# Patient Record
Sex: MALE | Race: WHITE | NOT HISPANIC OR LATINO | Employment: OTHER | URBAN - METROPOLITAN AREA
[De-identification: names, ages, dates, MRNs, and addresses within clinical notes are randomized per-mention and may not be internally consistent; named-entity substitution may affect disease eponyms.]

---

## 2022-04-25 ENCOUNTER — HOSPITAL ENCOUNTER (EMERGENCY)
Facility: CLINIC | Age: 72
Discharge: HOME | End: 2022-04-25
Attending: EMERGENCY MEDICINE | Admitting: EMERGENCY MEDICINE
Payer: COMMERCIAL

## 2022-04-25 VITALS
DIASTOLIC BLOOD PRESSURE: 91 MMHG | SYSTOLIC BLOOD PRESSURE: 153 MMHG | OXYGEN SATURATION: 94 % | HEART RATE: 49 BPM | RESPIRATION RATE: 13 BRPM | TEMPERATURE: 97.4 F

## 2022-04-25 DIAGNOSIS — R55 NEAR SYNCOPE: ICD-10-CM

## 2022-04-25 DIAGNOSIS — R11.2 NON-INTRACTABLE VOMITING WITH NAUSEA, UNSPECIFIED VOMITING TYPE: ICD-10-CM

## 2022-04-25 LAB
ALBUMIN SERPL-MCNC: 3.2 G/DL (ref 3.4–5)
ALP SERPL-CCNC: 90 U/L (ref 40–150)
ALT SERPL W P-5'-P-CCNC: 33 U/L (ref 0–70)
ANION GAP SERPL CALCULATED.3IONS-SCNC: 6 MMOL/L (ref 3–14)
AST SERPL W P-5'-P-CCNC: 18 U/L (ref 0–45)
BASOPHILS # BLD AUTO: 0.1 10E3/UL (ref 0–0.2)
BASOPHILS NFR BLD AUTO: 1 %
BILIRUB SERPL-MCNC: 0.8 MG/DL (ref 0.2–1.3)
BUN SERPL-MCNC: 20 MG/DL (ref 7–30)
CALCIUM SERPL-MCNC: 8.6 MG/DL (ref 8.5–10.1)
CHLORIDE BLD-SCNC: 107 MMOL/L (ref 94–109)
CO2 SERPL-SCNC: 25 MMOL/L (ref 20–32)
CREAT SERPL-MCNC: 1.12 MG/DL (ref 0.66–1.25)
EOSINOPHIL # BLD AUTO: 0.2 10E3/UL (ref 0–0.7)
EOSINOPHIL NFR BLD AUTO: 3 %
ERYTHROCYTE [DISTWIDTH] IN BLOOD BY AUTOMATED COUNT: 12.9 % (ref 10–15)
GFR SERPL CREATININE-BSD FRML MDRD: 71 ML/MIN/1.73M2
GLUCOSE BLD-MCNC: 142 MG/DL (ref 70–99)
HCT VFR BLD AUTO: 47.5 % (ref 40–53)
HGB BLD-MCNC: 15.6 G/DL (ref 13.3–17.7)
IMM GRANULOCYTES # BLD: 0 10E3/UL
IMM GRANULOCYTES NFR BLD: 0 %
LIPASE SERPL-CCNC: 98 U/L (ref 73–393)
LYMPHOCYTES # BLD AUTO: 1.9 10E3/UL (ref 0.8–5.3)
LYMPHOCYTES NFR BLD AUTO: 22 %
MCH RBC QN AUTO: 28.9 PG (ref 26.5–33)
MCHC RBC AUTO-ENTMCNC: 32.8 G/DL (ref 31.5–36.5)
MCV RBC AUTO: 88 FL (ref 78–100)
MONOCYTES # BLD AUTO: 0.9 10E3/UL (ref 0–1.3)
MONOCYTES NFR BLD AUTO: 10 %
NEUTROPHILS # BLD AUTO: 5.6 10E3/UL (ref 1.6–8.3)
NEUTROPHILS NFR BLD AUTO: 64 %
NRBC # BLD AUTO: 0 10E3/UL
NRBC BLD AUTO-RTO: 0 /100
PLATELET # BLD AUTO: 164 10E3/UL (ref 150–450)
POTASSIUM BLD-SCNC: 3.9 MMOL/L (ref 3.4–5.3)
PROT SERPL-MCNC: 6.8 G/DL (ref 6.8–8.8)
RBC # BLD AUTO: 5.4 10E6/UL (ref 4.4–5.9)
SODIUM SERPL-SCNC: 138 MMOL/L (ref 133–144)
TROPONIN I SERPL HS-MCNC: 7 NG/L
WBC # BLD AUTO: 8.8 10E3/UL (ref 4–11)

## 2022-04-25 PROCEDURE — 84484 ASSAY OF TROPONIN QUANT: CPT | Performed by: EMERGENCY MEDICINE

## 2022-04-25 PROCEDURE — 258N000003 HC RX IP 258 OP 636: Performed by: EMERGENCY MEDICINE

## 2022-04-25 PROCEDURE — 96361 HYDRATE IV INFUSION ADD-ON: CPT

## 2022-04-25 PROCEDURE — 80053 COMPREHEN METABOLIC PANEL: CPT | Performed by: EMERGENCY MEDICINE

## 2022-04-25 PROCEDURE — 250N000011 HC RX IP 250 OP 636: Performed by: EMERGENCY MEDICINE

## 2022-04-25 PROCEDURE — 83690 ASSAY OF LIPASE: CPT | Performed by: EMERGENCY MEDICINE

## 2022-04-25 PROCEDURE — 96374 THER/PROPH/DIAG INJ IV PUSH: CPT

## 2022-04-25 PROCEDURE — 99284 EMERGENCY DEPT VISIT MOD MDM: CPT | Mod: 25

## 2022-04-25 PROCEDURE — 85004 AUTOMATED DIFF WBC COUNT: CPT | Performed by: EMERGENCY MEDICINE

## 2022-04-25 PROCEDURE — 36415 COLL VENOUS BLD VENIPUNCTURE: CPT | Performed by: EMERGENCY MEDICINE

## 2022-04-25 PROCEDURE — 93005 ELECTROCARDIOGRAM TRACING: CPT

## 2022-04-25 RX ORDER — ONDANSETRON 2 MG/ML
4 INJECTION INTRAMUSCULAR; INTRAVENOUS ONCE
Status: COMPLETED | OUTPATIENT
Start: 2022-04-25 | End: 2022-04-25

## 2022-04-25 RX ADMIN — ONDANSETRON 4 MG: 2 INJECTION INTRAMUSCULAR; INTRAVENOUS at 10:12

## 2022-04-25 RX ADMIN — SODIUM CHLORIDE, POTASSIUM CHLORIDE, SODIUM LACTATE AND CALCIUM CHLORIDE 1000 ML: 600; 310; 30; 20 INJECTION, SOLUTION INTRAVENOUS at 10:13

## 2022-04-25 ASSESSMENT — ENCOUNTER SYMPTOMS
CONFUSION: 1
LIGHT-HEADEDNESS: 1
DIAPHORESIS: 1
VOMITING: 1
NAUSEA: 1
WEAKNESS: 1

## 2022-04-25 NOTE — ED NOTES
Patient arrives via EMS, patient was having breakfast with friends and began not feeling well, patient nauseated and having vomiting. Patient has had similar episodes before.  Lungs sounds clear and equal, active bowel sounds in all quadrants.

## 2022-04-25 NOTE — ED PROVIDER NOTES
"  History   Chief Complaint:  Vomiting       The history is provided by the patient and the EMS personnel.      Alex Youngblood is a 71 year old male with history of seizures who presents via EMS with concern for seizure. The patient reportedly woke up at 6:15 AM this morning and went through his normal routine of sitting and reading the paper while drinking his coffee. At some point during breakfast the patient began to feel lightheaded and per EMS had a \"staring episode\" wherein he became unresponsive. He reports feeling woozy and nauseated. Patient denies having the sensation of body warmth or visual changes though he did become quite diaphoretic once the episode ended and also vomited.    Here in ED the patient still feel generally week but overall feels improved. He is no longer nauseated. The patient has not identified any pattern to link the episodes. He notes that he is sometimes able to \"fight off\" the episodes when they come on more slowly but states that he was unable to do so today due to acute onset. No headache. His bowel and bladder are functioning normally. He does report that the last time he had similar symptoms was ~1 year ago following a past episode. He adds that he was evaluated by Cardiology and Neurology at that time but no diagnosis was found. Patient states that he was told the episodes may have occurred secondary to dehydration. He adds that it bothers him that the episodes are so disruptive and thinks this anxiety may be triggering his episodes in some part. He does not recall discussing medication or PT at any point in the past to try and prevent recurring episodes nor made a plan for if the episodes return. Patient is a fairly active individual and tried to exercise regularly. He was planning on working out today before his episode occurred. He denies any prior issues with blood sugar.    Review of Systems   Constitutional: Positive for diaphoresis (after episode).   Eyes: Negative for " visual disturbance.   Gastrointestinal: Positive for nausea (improving) and vomiting.   Neurological: Positive for weakness and light-headedness.   Psychiatric/Behavioral: Positive for confusion (unresponsive staring episode).   All other systems reviewed and are negative.    Allergies:  Oxycodone    Medications:  Amlodipine   Atorvastatin  Losartan    Past Medical History:     Seizures  Hypertension    Past Surgical History:    Right knee arthroscopy  Colonoscopy  Bilateral ankle reconstruction    Family History:    Hypertension    Social History:  The patient was accompanied to the ED by EMS.  Marital status:   The patient resides at home with his wife.  Occupation: Retired, was formerly a teacher    Physical Exam     Patient Vitals for the past 24 hrs:   BP Temp Pulse Resp SpO2   04/25/22 1200 (!) 153/91 -- (!) 49 -- --   04/25/22 1100 (!) 143/74 -- (!) 49 13 94 %   04/25/22 0930 121/66 97.4  F (36.3  C) 50 18 95 %       Physical Exam    HENT:  mmm, no rhinorrhea  Eyes: periorbital tissues and sclera normal, pupils 4 mm and reactive  Neck: supple, no abnormal swelling  Lungs:  CTAB,  no resp distress  CV: rrr, no m/r/g, ppi  Abd: soft, nontender, nondistended, no rebound/masses/guarding/hsm  Ext: no peripheral edema  Skin: warm, dry, well perfused, no rashes/bruising/lesions on exposed skin  Neuro: alert, MAEE, no gross motor or sensory deficits, gait stable  Psych: Normal mood, normal affect      Emergency Department Course     ECG  ECG obtained at 1138, ECG read at 1138  Sinus bradycardia  Nonspecific T wave abnormality   Agree with computer interpretation.   Rate 52 bpm. VT interval 150 ms. QRS duration 88 ms. QT/QTc 452/420 ms. P-R-T axes 56 63 82.     Laboratory:  CBC: WBC 8.8, HGB 15.6,   CMP: Glucose 142 (H), Albumin 3.2 (L), o/w WNL (Creatinine 1.12)  Lipase: 98    Troponin I high sensitivity (Collected 1013): 7    Emergency Department Course:     Reviewed:  I reviewed nursing notes, vitals,  past medical history and Care Everywhere.    Assessments:  0942 I obtained history and examined the patient in ED room 16 as noted above.   1116 I rechecked the patient and explained findings.     Interventions:  1012 Zofran 4 mg IV  1013 LR 1L IV Bolus     Disposition:  The patient was discharged to home.     Impression & Plan       Medical Decision Makin-year-old gentleman here after episode of what sounds like near syncope and post episode of vomiting.  He had similar episodes before had a thorough work-up for both neurology and cardiology that is reviewed in care everywhere.  No etiology was found at that time and thought to be neurocardiogenic syncope.  His wife notes that recently about 3 weeks ago he was restarted on blood pressure medication.  He did well here in the ED with no recurrent episodes.  EKG shows no signs of ischemia or high risk for arrhythmia.  He had previously completed a Zio patch during work-up of his last episodes and no concerns were found.  3-day EEG was negative as well.  No focal neurologic deficits are noted here.  No description of ictal activity or classic postictal period.   Given his appearance here reassuring examination EKG blood work and previous thorough work-up but he is safe to be discharged home to follow-up with his primary care provider.  Would have him hold his restarting blood pressure medicine until he can see his primary doctor in follow-up to discuss risks and benefits.    Diagnosis:    ICD-10-CM    1. Near syncope  R55    2. Non-intractable vomiting with nausea, unspecified vomiting type  R11.2        Discharge Medications:  None.      Scribe Disclosure:  I, Heather Broussard, am serving as a scribe at 9:42 AM on 2022 to document services personally performed by Hollis Fine MD based on my observations and the provider's statements to me.     This note was completed in part using Dragon voice recognition software. Although reviewed after  completion, some word and grammatical errors may occur.              Hollis Fine MD  04/25/22 1883

## 2022-04-25 NOTE — ED NOTES
Patient ready for discharge, patient sat up on edge of bed and became dizzy and stated he didn't feel like he could leave yet.  Writer provided education regarding what he was treated for today and patient continued to state that he felt dizzy and needed another 30 to 40 mins before he leaves. Provided notified.

## 2022-04-25 NOTE — ED NOTES
Patient placed on call light and stated he feels like he can go home, patient states he still gets slightly dizzy but feels as if he can go home safely. Patient assisted out to car with wife.

## 2022-04-25 NOTE — ED TRIAGE NOTES
"Patient presents to the ED via ambulance following an episode of \"staring\" while at breakfast. Per report, patient was staring and not responsive. Then vomited and became very diaphoretic. Was not postictal afterwards. Patient states has had similar episodes to this in the past and has been worked up by cardiology and neurology with no diagnosis. States is weak, but is starting to feel better.   "

## 2022-04-26 LAB
ATRIAL RATE - MUSE: 52 BPM
DIASTOLIC BLOOD PRESSURE - MUSE: NORMAL MMHG
INTERPRETATION ECG - MUSE: NORMAL
P AXIS - MUSE: 56 DEGREES
PR INTERVAL - MUSE: 150 MS
QRS DURATION - MUSE: 88 MS
QT - MUSE: 452 MS
QTC - MUSE: 420 MS
R AXIS - MUSE: 63 DEGREES
SYSTOLIC BLOOD PRESSURE - MUSE: NORMAL MMHG
T AXIS - MUSE: 82 DEGREES
VENTRICULAR RATE- MUSE: 52 BPM

## 2022-09-03 ENCOUNTER — HOSPITAL ENCOUNTER (EMERGENCY)
Facility: CLINIC | Age: 72
Discharge: HOME | End: 2022-09-03
Attending: PHYSICIAN ASSISTANT | Admitting: PHYSICIAN ASSISTANT
Payer: COMMERCIAL

## 2022-09-03 ENCOUNTER — APPOINTMENT (OUTPATIENT)
Dept: CT IMAGING | Facility: CLINIC | Age: 72
End: 2022-09-03
Attending: PHYSICIAN ASSISTANT
Payer: COMMERCIAL

## 2022-09-03 VITALS
RESPIRATION RATE: 18 BRPM | WEIGHT: 210 LBS | BODY MASS INDEX: 28.44 KG/M2 | DIASTOLIC BLOOD PRESSURE: 77 MMHG | OXYGEN SATURATION: 93 % | HEIGHT: 72 IN | HEART RATE: 49 BPM | SYSTOLIC BLOOD PRESSURE: 136 MMHG | TEMPERATURE: 97.6 F

## 2022-09-03 DIAGNOSIS — R40.4 EPISODE OF ALTERED CONSCIOUSNESS: ICD-10-CM

## 2022-09-03 DIAGNOSIS — R56.9 SEIZURE-LIKE ACTIVITY (H): ICD-10-CM

## 2022-09-03 LAB
ALBUMIN SERPL BCG-MCNC: 3.8 G/DL (ref 3.5–5.2)
ALP SERPL-CCNC: 104 U/L (ref 40–129)
ALT SERPL W P-5'-P-CCNC: 21 U/L (ref 10–50)
ANION GAP SERPL CALCULATED.3IONS-SCNC: 14 MMOL/L (ref 7–15)
AST SERPL W P-5'-P-CCNC: 22 U/L (ref 10–50)
BASOPHILS # BLD AUTO: 0.1 10E3/UL (ref 0–0.2)
BASOPHILS NFR BLD AUTO: 1 %
BILIRUB SERPL-MCNC: 0.8 MG/DL
BUN SERPL-MCNC: 18.5 MG/DL (ref 8–23)
CALCIUM SERPL-MCNC: 8.6 MG/DL (ref 8.8–10.2)
CHLORIDE SERPL-SCNC: 103 MMOL/L (ref 98–107)
CREAT SERPL-MCNC: 1.17 MG/DL (ref 0.67–1.17)
DEPRECATED HCO3 PLAS-SCNC: 22 MMOL/L (ref 22–29)
EOSINOPHIL # BLD AUTO: 0.3 10E3/UL (ref 0–0.7)
EOSINOPHIL NFR BLD AUTO: 3 %
ERYTHROCYTE [DISTWIDTH] IN BLOOD BY AUTOMATED COUNT: 12.8 % (ref 10–15)
GFR SERPL CREATININE-BSD FRML MDRD: 67 ML/MIN/1.73M2
GLUCOSE SERPL-MCNC: 155 MG/DL (ref 70–99)
HCT VFR BLD AUTO: 49.4 % (ref 40–53)
HGB BLD-MCNC: 15.7 G/DL (ref 13.3–17.7)
HOLD SPECIMEN: NORMAL
HOLD SPECIMEN: NORMAL
IMM GRANULOCYTES # BLD: 0 10E3/UL
IMM GRANULOCYTES NFR BLD: 0 %
LYMPHOCYTES # BLD AUTO: 3.3 10E3/UL (ref 0.8–5.3)
LYMPHOCYTES NFR BLD AUTO: 31 %
MAGNESIUM SERPL-MCNC: 2 MG/DL (ref 1.7–2.3)
MCH RBC QN AUTO: 28.2 PG (ref 26.5–33)
MCHC RBC AUTO-ENTMCNC: 31.8 G/DL (ref 31.5–36.5)
MCV RBC AUTO: 89 FL (ref 78–100)
MONOCYTES # BLD AUTO: 1.2 10E3/UL (ref 0–1.3)
MONOCYTES NFR BLD AUTO: 11 %
NEUTROPHILS # BLD AUTO: 5.6 10E3/UL (ref 1.6–8.3)
NEUTROPHILS NFR BLD AUTO: 54 %
NRBC # BLD AUTO: 0 10E3/UL
NRBC BLD AUTO-RTO: 0 /100
PLATELET # BLD AUTO: 180 10E3/UL (ref 150–450)
POTASSIUM SERPL-SCNC: 3.3 MMOL/L (ref 3.4–5.3)
PROT SERPL-MCNC: 6.5 G/DL (ref 6.4–8.3)
RBC # BLD AUTO: 5.57 10E6/UL (ref 4.4–5.9)
SODIUM SERPL-SCNC: 139 MMOL/L (ref 136–145)
TROPONIN T SERPL HS-MCNC: 9 NG/L
TSH SERPL DL<=0.005 MIU/L-ACNC: 3.35 UIU/ML (ref 0.3–4.2)
WBC # BLD AUTO: 10.5 10E3/UL (ref 4–11)

## 2022-09-03 PROCEDURE — 80053 COMPREHEN METABOLIC PANEL: CPT | Performed by: PHYSICIAN ASSISTANT

## 2022-09-03 PROCEDURE — 96361 HYDRATE IV INFUSION ADD-ON: CPT

## 2022-09-03 PROCEDURE — 85025 COMPLETE CBC W/AUTO DIFF WBC: CPT | Performed by: PHYSICIAN ASSISTANT

## 2022-09-03 PROCEDURE — 70450 CT HEAD/BRAIN W/O DYE: CPT

## 2022-09-03 PROCEDURE — 36415 COLL VENOUS BLD VENIPUNCTURE: CPT | Performed by: PHYSICIAN ASSISTANT

## 2022-09-03 PROCEDURE — 84443 ASSAY THYROID STIM HORMONE: CPT | Performed by: PHYSICIAN ASSISTANT

## 2022-09-03 PROCEDURE — 93005 ELECTROCARDIOGRAM TRACING: CPT

## 2022-09-03 PROCEDURE — 258N000003 HC RX IP 258 OP 636: Performed by: PHYSICIAN ASSISTANT

## 2022-09-03 PROCEDURE — 250N000011 HC RX IP 250 OP 636: Performed by: PHYSICIAN ASSISTANT

## 2022-09-03 PROCEDURE — 84484 ASSAY OF TROPONIN QUANT: CPT | Performed by: PHYSICIAN ASSISTANT

## 2022-09-03 PROCEDURE — 96374 THER/PROPH/DIAG INJ IV PUSH: CPT

## 2022-09-03 PROCEDURE — 99285 EMERGENCY DEPT VISIT HI MDM: CPT | Mod: 25

## 2022-09-03 PROCEDURE — 83735 ASSAY OF MAGNESIUM: CPT | Performed by: PHYSICIAN ASSISTANT

## 2022-09-03 RX ORDER — ONDANSETRON 2 MG/ML
4 INJECTION INTRAMUSCULAR; INTRAVENOUS ONCE
Status: COMPLETED | OUTPATIENT
Start: 2022-09-03 | End: 2022-09-03

## 2022-09-03 RX ADMIN — ONDANSETRON 4 MG: 2 INJECTION INTRAMUSCULAR; INTRAVENOUS at 12:46

## 2022-09-03 RX ADMIN — SODIUM CHLORIDE 1000 ML: 9 INJECTION, SOLUTION INTRAVENOUS at 12:45

## 2022-09-03 ASSESSMENT — ENCOUNTER SYMPTOMS
VOMITING: 1
DIZZINESS: 0
TREMORS: 1
WEAKNESS: 1
NAUSEA: 1
DIAPHORESIS: 1
SHORTNESS OF BREATH: 0

## 2022-09-03 ASSESSMENT — ACTIVITIES OF DAILY LIVING (ADL)
ADLS_ACUITY_SCORE: 35
ADLS_ACUITY_SCORE: 35

## 2022-09-03 NOTE — ED PROVIDER NOTES
History   Chief Complaint:  Seizure like activity     HPI    Alex Youngblood is a 71 year old male with history of hypertension and hyperlipidemia who presents after full body tremors which lasted about 3-5 minutes. The patient states he felt unwell and quesy for a few seconds before.  Per EMS report the episode occurred while he was at lunch, and it was witnessed by an RN who held him upright and prevented any falls or injuries. EMS was arrived to find him pale and diaphoretic; however, they report this has improved greatly upon evaluation.  He was not confused. He was able to answer questions upon their arrival as well. En route he was bradycardic in the 40s-50s with occasional tachycardic. He does report his pulse normally runs from 45-60. He was also nauseous with vomiting for which he was given 4 mg of Zofran about 8 minutes ago. Upon evaluation, he has no pain but does have a feeling of generalized weakness. He has had this happen multiple times in the past with an unknown etiology, and he does explain that today's episode feels similar to those in the past. He does not take seizure medications, but he does report recently changing his dosage of blood pressure mediation. He denies chest pain, shortness of breath, palpitations, or dizziness. He does admit to drinking alcohol occasionally though not everyday and no hx of withdrawal.    Review of Systems   Constitutional: Positive for diaphoresis.   Respiratory: Negative for shortness of breath.    Cardiovascular: Negative for chest pain.   Gastrointestinal: Positive for nausea and vomiting.   Skin: Positive for pallor.   Neurological: Positive for tremors and weakness. Negative for dizziness.   All other systems reviewed and are negative.      Allergies:  Oxycodone     Medications:  Amlodipine   Atorvastatin   Losartan   Escitalopram   Celecoxib    Past Medical History:     Hypertension  Hyperlipidemia   Anxiety   Primary osteoarthritis in right hip  Umbilical  hernia   Tobacco use disorder   [facility]       Past Surgical History:    Right knee arthroscopy  Reconstruction of bilateral ankles    Tonsillectomy   [place] teeth extraction  Cataract extraction  Hernia repair      Family History:    Brother: hypertension   Father: heart disease, hypertension   Mother: hypertension  Sister: hypertension     Social History:  The patient presents to the ED by means of ambulance.  PCP: Alex Macias     Physical Exam     Patient Vitals for the past 24 hrs:   BP Temp Pulse Resp SpO2 Height Weight   09/03/22 1605 -- -- -- 18 -- -- --   09/03/22 1545 136/77 -- (!) 49 -- 93 % -- --   09/03/22 1530 137/75 -- 68 -- 93 % -- --   09/03/22 1515 136/77 -- 52 -- 96 % -- --   09/03/22 1310 -- -- 52 -- 91 % -- --   09/03/22 1300 116/66 -- 56 -- 92 % -- --   09/03/22 1242 136/67 97.6  F (36.4  C) (!) 45 -- 91 % -- --   09/03/22 1237 -- -- 51 18 99 % 1.829 m (6') 95.3 kg (210 lb)       Physical Exam  General: Awake, alert, non-toxic. Vomiting into emesis bag.  Head:  Scalp is NC/AT  Eyes:  Conjunctiva normal, PERRL  ENT:  The external nose and ears are normal.     Oropharynx clear and atraumatic.  Neck:  Normal range of motion without rigidity.  CV:  Regular rate and rhythm    No pathologic murmur, rubs, or gallops.  Resp:  Breath sounds are clear bilaterally    Non-labored, no retractions or accessory muscle use  Abdomen: Abdomen is soft, no distension, no tenderness, no masses.  MS:  No lower extremity edema or asymmetric calf swelling. No midline cervical, thoracic, or lumbar tenderness  Skin:  Warm and dry, No rash or lesions noted.  Neuro: Alert and oriented.  GCS 15 5/5 strength BL in UE and LE, normal sensation to touch.  Cranial nerves 2-12 intact.  Normal finger to nose testing.  Gait normal  Psych:  Awake. Alert. Normal affect. Appropriate interactions.    Emergency Department Course   ECG  ECG results from 09/03/22   EKG 12-lead, tracing only     Value    Systolic Blood  Pressure     Diastolic Blood Pressure     Ventricular Rate 45    Atrial Rate 45    VA Interval 152    QRS Duration 92        QTc 454    P Axis 47    R AXIS 46    T Axis 86    Interpretation ECG      Sinus bradycardia  Nonspecific T wave abnormality  Abnormal ECG  When compared with ECG of 25-APR-2022 11:38,  No significant change was found         Imaging:  Head CT w/o contrast   Final Result   IMPRESSION:   1.  No CT evidence for acute intracranial process.      2.  Brain atrophy and presumed chronic microvascular ischemic changes as above..      3.  Nothing for mass, mass effect or acute/evolving ischemic changes are identified.         Leadless EKG Monitor 3 to 14 Days    (Results Pending)     Report per radiology    Laboratory:  Labs Ordered and Resulted from Time of ED Arrival to Time of ED Departure   COMPREHENSIVE METABOLIC PANEL - Abnormal       Result Value    Sodium 139      Potassium 3.3 (*)     Creatinine 1.17      Urea Nitrogen 18.5      Chloride 103      Carbon Dioxide (CO2) 22      Anion Gap 14      Glucose 155 (*)     Calcium 8.6 (*)     Protein Total 6.5      Albumin 3.8      Bilirubin Total 0.8      Alkaline Phosphatase 104      AST 22      ALT 21      GFR Estimate 67     MAGNESIUM - Normal    Magnesium 2.0     TSH WITH FREE T4 REFLEX - Normal    TSH 3.35     TROPONIN T, HIGH SENSITIVITY - Normal    Troponin T, High Sensitivity 9     CBC WITH PLATELETS AND DIFFERENTIAL    WBC Count 10.5      RBC Count 5.57      Hemoglobin 15.7      Hematocrit 49.4      MCV 89      MCH 28.2      MCHC 31.8      RDW 12.8      Platelet Count 180      % Neutrophils 54      % Lymphocytes 31      % Monocytes 11      % Eosinophils 3      % Basophils 1      % Immature Granulocytes 0      NRBCs per 100 WBC 0      Absolute Neutrophils 5.6      Absolute Lymphocytes 3.3      Absolute Monocytes 1.2      Absolute Eosinophils 0.3      Absolute Basophils 0.1      Absolute Immature Granulocytes 0.0      Absolute NRBCs 0.0         Emergency Department Course:  Reviewed:  I reviewed nursing notes, vitals, past medical history and Care Everywhere    Assessments:  1230 I obtained history and examined the patient as noted above.   1300 I rechecked the patient and discussed history with patient's family member.   1405 I rechecked the patient and explained findings.       Interventions:  Medications   ondansetron (ZOFRAN) injection 4 mg (4 mg Intravenous Given 9/3/22 1246)   0.9% sodium chloride BOLUS (0 mLs Intravenous Stopped 9/3/22 1542)     Disposition:  The patient was discharged to home.     Impression & Plan     Medical Decision Makin-year-old male who presents after an episode of shaking and altered level of consciousness.  Patient was out to lunch when he had what was described as about a 3-minute episode of generalized full body shaking.  The patient does not remember this.  There was no tongue biting incontinence or postictal period afterwards however.  Differential would include syncopal episode versus seizure.  Has had extensive work-up of this in the past including MRI, EEG, Zio patch, stress echocardiogram, and been evaluated by neurology and cardiology.  Today's episode sounds less like a seizure given the absence of postictal period.  CT scan of the head is negative.  Electrolytes and glucose unremarkable.  He does not have any signs of alcohol withdrawal.  There was no trauma sustained during the event.      Labs are otherwise unremarkable.  EKG shows mild sinus bradycardia but this is baseline per the patient.  He has not had any chest pain or shortness of breath associated with this episode no palpitations troponin within normal limits nothing to suggest ACS PE, dissection.  Recent stress echocardiogram showed no evidence of CHF or valvular disease.  Patient does not feel dizzy ambulated without difficulty no longer feels nauseous or vomiting.  Low suspicion for cardiac etiology or more serious cause of symptoms however  given patient age and unclear nature of symptoms discussed observation however the patient strongly prefers to go home I do think this is reasonable given his extensive prior work-up.  We will have him do another outpatient Zio patch to evaluate for any underlying arrhythmia and also follow-up with cardiology and PCP.  Will return immediately for new worsening or changing symptoms.  Diagnosis:    ICD-10-CM    1. Episode of altered consciousness  R40.4 Leadless EKG Monitor 3 to 14 Days     Follow-Up with Cardiology   2. Seizure-like activity (H)  R56.9 Leadless EKG Monitor 3 to 14 Days       Discharge Medications:  None    Scribe Disclosure:  I, Joshua Kjer, am serving as a scribe at 12:34 PM on 9/3/2022 to document services personally performed by Donta Taveras,  based on my observations and the provider's statements to me.            Donta Taveras PA-C  09/03/22 [id number]

## 2022-09-03 NOTE — ED PROVIDER NOTES
"ED ATTENDING PHYSICIAN NOTE:   I evaluated this patient in conjunction with Donta Taveras PA-C  I have participated in the care of the patient and personally performed key elements of the history, exam, and medical decision making.      HPI:   Alex Youngblood is a 71 year old male presents with an episode of loss of consciousness.  He has had a history of recurrent episodes of loss of consciousness which have been unclear whether this represents atypical seizure versus syncope.  He underwent video EEG which was unremarkable.  An MRI of the brain was also negative for acute disease.  He had a prior Holter monitor in 2021 without dysrhythmia.  Stress echocardiogram was also unremarkable.    Today patient was eating at [employer].  He had the sudden onset of feeling lightheaded and dizzy which lasted approximately 5 to 15 seconds.  Wife then noted that he became unresponsive.  He was \"bobbing his head\" and he had rhythmic movements of his upper extremities.  These were not violent and they were not associated with any rigidity.  She reports that during the episode, she said his name, he opened his eyes and looked at her.  There was no urinary or stool incontinence.  Episode lasted approximately 1 minute and patient had no postictal confusion.  He was able to notice his wife immediately upon awakening.  He then became nauseous and vomited multiple times prompting ED evaluation.  At this point, he feels generally fatigued and nauseated but otherwise is asymptomatic.  He denies any chest pain, shortness of breath or palpitations.     EXAM:     HEENT:    Oropharynx is moist, without lesions or trismus.     No tongue laceration  Eyes:    Conjunctiva normal     PERRL     EOMs intact  Neck:    Supple, no meningismus.     CV:     Regular rate and rhythm.      No murmurs, rubs or gallops.       No unilateral leg swelling.       2+ radial pulses bilateral.       No lower extremity edema.  PULM:    Clear to auscultation " bilateral.       No respiratory distress.      Good air exchange.     No rales or wheezing.     No stridor.  ABD:    Soft, non-tender, non-distended.       No pulsatile masses.       No rebound, guarding or rigidity.  MSK:     No gross deformity to all four extremities.   LYMPH:   No cervical lymphadenopathy.  NEURO:   Alert & #     CN II-XII intact, speech is clear with no aphasia.       Strength is 5/5 in all 4 extremities.  Sensation is intact.       Normal muscular tone, no tremor.  Skin:    Warm, dry and intact.    Psych:    Mood is good and affect is appropriate.         MEDICAL DECISION MAKING/ASSESSMENT AND PLAN:     71-year-old male presented to the ED with an episode of loss of consciousness.  His history is less suggestive of seizure as supported by lack of rigidity, incontinence or confusion upon awakening.  Historical information is most consistent with syncope/near syncope with myoclonus.  He has no hypoglycemia or electrolyte disturbance to induce seizure.  Prior video EEG was unremarkable.    Primary concern today was for syncope/near syncope.  EKG is without dysrhythmia.  He's had episodes of sinus bradycardia but otherwise unremarkable.  Prior stress stress echocardiogram and Holter monitor were unremarkable.  Low suspicion for sinister pathology.  Given this well-established condition that has been recurrent, no indication for admission at this time.  We will repeat Zio patch to evaluate for transient dysrhythmia.  Close follow-up with primary physician as well as referral back to cardiology.  Return to ED to ED for any worsening symptoms.    DIAGNOSIS:     ICD-10-CM    1. Episode of altered consciousness  R40.4             DISPOSITION:   Discharge to home       9/3/2022  [facility] EMERGENCY DEPT     Jimmy Ruffin MD  09/03/22 [id number]

## 2022-09-03 NOTE — DISCHARGE INSTRUCTIONS
Discharge Instructions  Syncope    Syncope (fainting) is a sudden, short loss of consciousness (passing out spell). People will usually fall to the ground when they faint or slump over if seated.  People may also shake when this happens, and it can sometimes be difficult to tell the difference between syncope and a seizure. At this time, your provider does not find a reason to suspect that your fainting spell is a sign of anything dangerous or life-threatening.  However, sometimes the signs of serious illness do not show up right away.     Generally, every Emergency Department visit should have a follow-up clinic visit with either a primary or a specialty clinic/provider. Please follow-up as instructed by your emergency provider today.    Return to the Emergency Department if:  You faint again.   You have any significant bleeding.  You have chest pain or a fast or irregular heartbeat.  You feel short of breath.  You cough up any blood.  You have abdominal (belly) pain or unusual back pain.  You have ongoing vomiting (throwing up) or diarrhea (loose stools).  You have a black or tarry bowel movement, or blood in the stool or in your vomit.  You have a fever over 101 F.  You lose feeling or cannot move a part of your body or cannot talk normally.  You are confused, have a headache, cannot see well, or have a seizure.  DO NOT DRIVE. CALL 911 INSTEAD!    What can I do to help myself?  Follow any specific instructions that your provider discussed with you.  If you feel light-headed, make sure to sit down right away, even if you have to sit on the floor.  Follow up with your regular medical provider as discussed for further management. This may include lowering your blood pressure medications, insulin or other diabetic medications, checking your blood sugar more frequently, and drinking more fluids, taking medicines for vomiting or diarrhea or getting up slower.  If you were given a prescription for medicine here today,  be sure to read all of the information (including the package insert) that comes with your prescription.  This will include important information about the medicine, its side effects, and any warnings that you need to know about.  The pharmacist who fills the prescription can provide more information and answer questions you may have about the medicine.  If you have questions or concerns that the pharmacist cannot address, please call or return to the Emergency Department.   Remember that you can always come back to the Emergency Department if you are not able to see your regular provider in the amount of time listed above, if you get any new symptoms, or if there is anything that worries you.  Discharge Instructions  First Time Seizure (Convulsion)    You have had a spell that may have been a seizure. Many other things can look like a seizure, including a fainting spell, a migraine, psychological issues, and other movement disorders. It can often be hard to know with certainty whether you had a seizure. Your evaluation today is likely not be complete and you may need further testing and evaluation from a neurologist (brain specialist).    Of people who have a seizure, most never have another one. For that reason, anti-seizure medicines are not started in most cases after a first seizure. If you have risk factors for seizures, medication may be started after a first seizure. People are not usually kept in the hospital after a seizure.    During a seizure, there is abnormal and excessive electrical activity in the brain. This can cause changes in awareness, behavior, and abnormal shaking or jerking movements.  This activity usually lasts only a few seconds to minutes. The period following a seizure is called the postictal state. During this time, you may be confused, tired, and you may develop a throbbing headache. Some people develop a brief period of difficulty speaking or experience temporary vision loss,  numbness, or weakness.    Generally, every Emergency Department visit should have a follow-up clinic visit with either a primary or a specialty clinic/provider. Please follow-up as instructed by your emergency provider today.    Return to the Emergency Department if:   You have another seizure.  You develop a fever over 100.4 F.  You feel much more ill, or develop new symptoms like severe headache.  You have trouble walking, seeing, or develop weakness or numbness in your arms or legs.     What can I do to help myself?  Do not drive until you have been rechecked by your provider and have been told it is safe to drive.  If you have a seizure while driving you may cause a motor vehicle accident with injury or death to yourself or others.   Do not swim, climb ladders, or do anything else that would be dangerous if you had another seizure or spell of loss of consciousness, until you are cleared by your provider.    Check your state driving requirements for patients with seizures on the [employer] Website at www.epilepsyfoundation.org/resources/drivingandtravel.cfm.  Do not drink alcohol.  Drinking alcohol increases the risk of seizures and can interfere with the effect of anti-seizure medications.  Take any medication prescribed for you exactly as directed, at the right times, and at the right doses.    If you were given a prescription for medicine here today, be sure to read all of the information (including the package insert) that comes with your prescription.  This will include important information about the medicine, its side effects, and any warnings that you need to know about.  The pharmacist who fills the prescription can provide more information and answer questions you may have about the medicine.  If you have questions or concerns that the pharmacist cannot address, please call or return to the Emergency Department.     Remember that you can always come back to the Emergency Department if you are  not able to see your regular provider in the amount of time listed above, if you get any new symptoms, or if there is anything that worries you.

## 2022-09-03 NOTE — ED TRIAGE NOTES
Witnessed seizure at [place] [place] aprox 5 minutes. A/OX4 on EMS arrival with N/V; diaphoresis, and pallor. 4mg zofran and 18 PIV inserted EMS PTA. Similar episode happened 3 months ago with full workup; all negative. No dx or medications for seizures. Bradycardia with periods of tachycardia. No postictal period or incontinence. ABC in tact. A/OX 4

## 2022-09-06 LAB
ATRIAL RATE - MUSE: 45 BPM
DIASTOLIC BLOOD PRESSURE - MUSE: NORMAL MMHG
INTERPRETATION ECG - MUSE: NORMAL
P AXIS - MUSE: 47 DEGREES
PR INTERVAL - MUSE: 152 MS
QRS DURATION - MUSE: 92 MS
QT - MUSE: 526 MS
QTC - MUSE: 454 MS
R AXIS - MUSE: 46 DEGREES
SYSTOLIC BLOOD PRESSURE - MUSE: NORMAL MMHG
T AXIS - MUSE: 86 DEGREES
VENTRICULAR RATE- MUSE: 45 BPM

## 2022-09-13 ENCOUNTER — HOSPITAL ENCOUNTER (OUTPATIENT)
Dept: CARDIOLOGY | Facility: CLINIC | Age: 72
Discharge: HOME | End: 2022-09-13
Attending: PHYSICIAN ASSISTANT | Admitting: PHYSICIAN ASSISTANT
Payer: COMMERCIAL

## 2022-09-13 DIAGNOSIS — R40.4 EPISODE OF ALTERED CONSCIOUSNESS: ICD-10-CM

## 2022-09-13 DIAGNOSIS — R56.9 SEIZURE-LIKE ACTIVITY (H): ICD-10-CM

## 2022-09-13 PROCEDURE — 93248 EXT ECG>7D<15D REV&INTERPJ: CPT | Performed by: INTERNAL MEDICINE

## 2022-09-13 PROCEDURE — 93242 EXT ECG>48HR<7D RECORDING: CPT

## 2022-10-05 ENCOUNTER — OFFICE VISIT (OUTPATIENT)
Dept: CARDIOLOGY | Facility: CLINIC | Age: 72
End: 2022-10-05
Payer: COMMERCIAL

## 2022-10-05 VITALS
HEIGHT: 72 IN | OXYGEN SATURATION: 98 % | HEART RATE: 61 BPM | SYSTOLIC BLOOD PRESSURE: 132 MMHG | DIASTOLIC BLOOD PRESSURE: 78 MMHG | WEIGHT: 216.3 LBS | BODY MASS INDEX: 29.3 KG/M2

## 2022-10-05 DIAGNOSIS — I10 ESSENTIAL HYPERTENSION, BENIGN: ICD-10-CM

## 2022-10-05 DIAGNOSIS — R55 SYNCOPE, UNSPECIFIED SYNCOPE TYPE: Primary | ICD-10-CM

## 2022-10-05 PROCEDURE — 99205 OFFICE O/P NEW HI 60 MIN: CPT | Performed by: INTERNAL MEDICINE

## 2022-10-05 RX ORDER — LOSARTAN POTASSIUM 50 MG/1
50 TABLET ORAL DAILY
Qty: 90 TABLET | Refills: 3 | Status: SHIPPED | OUTPATIENT
Start: 2022-10-05 | End: 2023-03-31

## 2022-10-05 RX ORDER — AMLODIPINE BESYLATE 10 MG/1
10 TABLET ORAL DAILY
COMMUNITY
Start: 2022-09-07 | End: 2023-03-31

## 2022-10-05 RX ORDER — ATORVASTATIN CALCIUM 20 MG/1
20 TABLET, FILM COATED ORAL DAILY
COMMUNITY
Start: 2022-09-07 | End: 2023-03-31

## 2022-10-05 RX ORDER — ESCITALOPRAM OXALATE 10 MG/1
TABLET ORAL
COMMUNITY
Start: 2022-09-07 | End: 2023-03-31

## 2022-10-05 RX ORDER — AMLODIPINE BESYLATE AND ATORVASTATIN CALCIUM 5; 10 MG/1; MG/1
TABLET, FILM COATED ORAL
COMMUNITY
End: 2022-10-05

## 2022-10-05 RX ORDER — LOSARTAN POTASSIUM 100 MG/1
100 TABLET ORAL DAILY
COMMUNITY
Start: 2022-09-24 | End: 2022-10-05

## 2022-10-05 RX ORDER — CARVEDILOL 3.12 MG/1
3.12 TABLET ORAL 2 TIMES DAILY WITH MEALS
Qty: 180 TABLET | Refills: 3 | Status: SHIPPED | OUTPATIENT
Start: 2022-10-05 | End: 2023-03-31

## 2022-10-05 NOTE — PATIENT INSTRUCTIONS
Try reducing alcohol on an empty stomach.  Cut your LOSARTAN in half to 50mg daily.  Message me when you're taking what pills.    Do this for two weeks and see how you feel.    Compression stockings or leggings ($20 off [employer]) the strength is 20-30mmHg. You might want to do that when you eat out for dinner. Avoid the alcohol during those meals.    Check your blood pressures and let me know if they're consistently over 140 over the next two weeks.    If you're feeling better, but still not great then we will add a small dose of Carvedilol which can calm down the extra heart beats and fast heart beats that we saw on monitor. This can slow your already slow heart down but can help with some other symptoms so we might try it.    Echocardiogram and carotid ultrasound.    See you back in 6 months.    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

## 2022-10-05 NOTE — PROGRESS NOTES
Cardiology Consultation      Alex Youngblood MRN# [id number]   YOB: 1950 Age: 71 year old   Date of Visit 10/05/2022     Reason for consult:  Syncope           Assessment and Plan:     1. 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  Also avoiding alcohol on an empty stomach and reducing his alcohol consumption overall.    Patient will message me in a couple of weeks to update on his symptoms.  Have also sent in a prescription for carvedilol that he may start despite his resting bradycardia he has appropriate augmentation of exertional heart rates.  He had a little bit of SVT on his monitor and sometimes in these cases and overaggressive vagal response can be improved with a small dose of beta-blocker.    Echocardiogram and carotid ultrasound to rule out valvular or stenotic contribution to cerebral hypoperfusion.    Routine follow-up in 6 months      60 minutes spent today in review of past medical record, discussion with patient and postvisit charting    This note was transcribed using electronic voice recognition software, typographical errors may be present.                Chief Complaint:   New Patient           History of Present Illness:   This patient is a very pleasant 71 year old male that I am meeting for the first time today.  I reviewed his past medical record and he has been having intermittent presyncope/syncope that is almost always associated with meals.  It can happen with breakfast lunch or dinner but has never happened with exertion or abruptly without a meal.    In the evenings, he may have 2 glasses of wine on an empty stomach prior to dinner.  He is on maximum dose losartan 100 mg daily.    He has resting bradycardia but has been able to augment his heart rate appropriately on ZIO  monitor.    He does have compression stockings but has not used them regularly.    We discussed his episodes.  He does have a prodrome of lightheadedness and dizziness.  If he lies down immediately, he can abort the event.    If he does not lie down immediately, the symptoms progress as expected.  His most pronounced events happened when there was a delay with getting him to lie down.  In one case, a nurse held him upright so his symptoms progressed to tonic-clonic motion which is typical and consistent with cerebral hypoperfusion in neurocardiogenic/orthostatic/splanchnic syncope.  I do not feel that he had an actual seizure.    He exercises regularly and feels well.  He was a previous .  He denies any exertional chest discomfort or dyspnea on exertion.         Physical Exam:     Vitals: /78 (BP Location: Right arm, Patient Position: Sitting, Cuff Size: Adult Regular)   Pulse 61   Ht 1.829 m (6')   Wt 98.1 kg (216 lb 4.8 oz)   SpO2 98%   BMI 29.34 kg/m    Constitutional:  cooperative, alert and oriented, well developed, well nourished, in no acute distress        Skin:  warm and dry to the touch, no apparent skin lesions or masses noted        Head:  normocephalic, no masses or lesions        Eyes:  pupils equal and round, conjunctivae and lids unremarkable, sclera white, no xanthalasma, EOMS intact, no nystagmus        ENT:  no pallor or cyanosis        Neck:  JVP normal;no carotid bruit        Chest:  normal symmetry        Cardiac: regular rhythm;no murmurs, gallops or rubs detected                  Abdomen:  BS normoactive        Extremities and Back:  no deformities, clubbing, cyanosis, erythema observed;no edema        Neurological:  no gross motor deficits;affect appropriate                      Past Medical History:   I have reviewed this patient's past medical history  Past Medical History:   Diagnosis Date     Seizures (H)      Syncope              Past Surgical  History:   I have reviewed this patient's past surgical history  Past Surgical History:   Procedure Laterality Date     ARTHROSCOPY KNEE      right     COLONOSCOPY  2013    Procedure: COLONOSCOPY;  Colonoscopy ;  Surgeon: Shiela Martinez MD;  Location:  GI     ORTHOPEDIC SURGERY      reconstruction bilateral ankles 35 years ago                Social History:   I have reviewed this patient's social history  Social History     Tobacco Use     Smoking status: Former Smoker     Quit date: 2013     Years since quittin.3     Smokeless tobacco: Not on file   Substance Use Topics     Alcohol use: Yes     Comment: socially              Family History:   I have reviewed this patient's family history  History reviewed. No pertinent family history.          Allergies:     Allergies   Allergen Reactions     Oxycodone Nausea and Vomiting             Medications:   I have reviewed this patient's current medications  Current Outpatient Medications   Medication Sig Dispense Refill     amLODIPine (NORVASC) 10 MG tablet Take 10 mg by mouth daily       atorvastatin (LIPITOR) 20 MG tablet Take 20 mg by mouth daily       carvedilol (COREG) 3.125 MG tablet Take 1 tablet (3.125 mg) by mouth 2 times daily (with meals) 180 tablet 3     escitalopram (LEXAPRO) 10 MG tablet TAKE 1 TABLET BY MOUTH EVERY DAY THIS IS 10 MG TAB, JUST TAKE 1 TAB       losartan (COZAAR) 50 MG tablet Take 1 tablet (50 mg) by mouth daily 90 tablet 3               Review of Systems:       Review of Systems:  Skin:  Negative     Eyes:  Negative    ENT:  Negative    Respiratory:  Negative    Cardiovascular:    Positive for;syncope or near-syncope;palpitations  Gastroenterology: Positive for nausea;vomiting  Genitourinary:  Positive for urinary frequency  Musculoskeletal:  Negative    Neurologic:       Psychiatric:  Positive for anxiety  Heme/Lymph/Imm:  Negative    Endocrine:  Negative                       Data:   All available laboratory data  reviewed  No results found for: CHOL  No results found for: HDL  No results found for: LDL  No results found for: TRIG  No results found for: CHOLHDLRATIO  TSH   Date Value Ref Range Status   09/03/2022 3.35 0.30 - 4.20 uIU/mL Final     Last Basic Metabolic Panel:  Lab Results   Component Value Date     09/03/2022     10/12/2014      Lab Results   Component Value Date    POTASSIUM 3.3 09/03/2022    POTASSIUM 3.9 04/25/2022    POTASSIUM 3.6 10/12/2014     Lab Results   Component Value Date    CHLORIDE 103 09/03/2022    CHLORIDE 107 04/25/2022    CHLORIDE 108 10/12/2014     Lab Results   Component Value Date    MARSHA 8.6 09/03/2022    MARSHA 8.7 10/12/2014     Lab Results   Component Value Date    CO2 22 09/03/2022    CO2 25 04/25/2022    CO2 24 10/12/2014     Lab Results   Component Value Date    BUN 18.5 09/03/2022    BUN 20 04/25/2022    BUN 19 10/12/2014     Lab Results   Component Value Date    CR 1.17 09/03/2022    CR 1.17 10/12/2014     Lab Results   Component Value Date     09/03/2022     04/25/2022     10/12/2014     Lab Results   Component Value Date    WBC 10.5 09/03/2022    WBC 8.8 10/12/2014     Lab Results   Component Value Date    RBC 5.57 09/03/2022    RBC 5.44 10/12/2014     Lab Results   Component Value Date    HGB 15.7 09/03/2022    HGB 15.9 10/12/2014     Lab Results   Component Value Date    HCT 49.4 09/03/2022    HCT 46.4 10/12/2014     Lab Results   Component Value Date    MCV 89 09/03/2022    MCV 85 10/12/2014     Lab Results   Component Value Date    MCH 28.2 09/03/2022    MCH 29.2 10/12/2014     Lab Results   Component Value Date    MCHC 31.8 09/03/2022    MCHC 34.3 10/12/2014     Lab Results   Component Value Date    RDW 12.8 09/03/2022    RDW 13.0 10/12/2014     Lab Results   Component Value Date     09/03/2022     10/12/2014

## 2022-10-05 NOTE — LETTER
10/5/2022    [address] [place] [address] [address]  [facility] [address]    RE: Alex NED Ford       Dear Colleague,     I had the pleasure of seeing Alex Youngblood in the [facility].  Cardiology Consultation      Alex Youngblood MRN# [id number]   YOB: 1950 Age: 71 year old   Date of Visit 10/05/2022     Reason for consult:  Syncope           Assessment and Plan:     1. 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  Also avoiding alcohol on an empty stomach and reducing his alcohol consumption overall.    Patient will message me in a couple of weeks to update on his symptoms.  Have also sent in a prescription for carvedilol that he may start despite his resting bradycardia he has appropriate augmentation of exertional heart rates.  He had a little bit of SVT on his monitor and sometimes in these cases and overaggressive vagal response can be improved with a small dose of beta-blocker.    Echocardiogram and carotid ultrasound to rule out valvular or stenotic contribution to cerebral hypoperfusion.    Routine follow-up in 6 months      60 minutes spent today in review of past medical record, discussion with patient and postvisit charting    This note was transcribed using electronic voice recognition software, typographical errors may be present.                Chief Complaint:   New Patient           History of Present Illness:   This patient is a very pleasant 71 year old male that I am meeting for the first time today.  I reviewed his past medical record and he has been having intermittent presyncope/syncope that is almost always associated with meals.  It can happen with breakfast lunch or dinner but has never happened with exertion or abruptly without a meal.    In the  evenings, he may have 2 glasses of wine on an empty stomach prior to dinner.  He is on maximum dose losartan 100 mg daily.    He has resting bradycardia but has been able to augment his heart rate appropriately on ZIO monitor.    He does have compression stockings but has not used them regularly.    We discussed his episodes.  He does have a prodrome of lightheadedness and dizziness.  If he lies down immediately, he can abort the event.    If he does not lie down immediately, the symptoms progress as expected.  His most pronounced events happened when there was a delay with getting him to lie down.  In one case, a nurse held him upright so his symptoms progressed to tonic-clonic motion which is typical and consistent with cerebral hypoperfusion in neurocardiogenic/orthostatic/splanchnic syncope.  I do not feel that he had an actual seizure.    He exercises regularly and feels well.  He was a previous .  He denies any exertional chest discomfort or dyspnea on exertion.         Physical Exam:     Vitals: /78 (BP Location: Right arm, Patient Position: Sitting, Cuff Size: Adult Regular)   Pulse 61   Ht 1.829 m (6')   Wt 98.1 kg (216 lb 4.8 oz)   SpO2 98%   BMI 29.34 kg/m    Constitutional:  cooperative, alert and oriented, well developed, well nourished, in no acute distress        Skin:  warm and dry to the touch, no apparent skin lesions or masses noted        Head:  normocephalic, no masses or lesions        Eyes:  pupils equal and round, conjunctivae and lids unremarkable, sclera white, no xanthalasma, EOMS intact, no nystagmus        ENT:  no pallor or cyanosis        Neck:  JVP normal;no carotid bruit        Chest:  normal symmetry        Cardiac: regular rhythm;no murmurs, gallops or rubs detected                  Abdomen:  BS normoactive        Extremities and Back:  no deformities, clubbing, cyanosis, erythema observed;no edema        Neurological:  no gross motor  deficits;affect appropriate                      Past Medical History:   I have reviewed this patient's past medical history  Past Medical History:   Diagnosis Date     Seizures (H)      Syncope              Past Surgical History:   I have reviewed this patient's past surgical history  Past Surgical History:   Procedure Laterality Date     ARTHROSCOPY KNEE      right     COLONOSCOPY  2013    Procedure: COLONOSCOPY;  Colonoscopy ;  Surgeon: Shiela Martinez MD;  Location:  GI     ORTHOPEDIC SURGERY      reconstruction bilateral ankles 35 years ago                Social History:   I have reviewed this patient's social history  Social History     Tobacco Use     Smoking status: Former Smoker     Quit date: 2013     Years since quittin.3     Smokeless tobacco: Not on file   Substance Use Topics     Alcohol use: Yes     Comment: socially              Family History:   I have reviewed this patient's family history  History reviewed. No pertinent family history.          Allergies:     Allergies   Allergen Reactions     Oxycodone Nausea and Vomiting             Medications:   I have reviewed this patient's current medications  Current Outpatient Medications   Medication Sig Dispense Refill     amLODIPine (NORVASC) 10 MG tablet Take 10 mg by mouth daily       atorvastatin (LIPITOR) 20 MG tablet Take 20 mg by mouth daily       carvedilol (COREG) 3.125 MG tablet Take 1 tablet (3.125 mg) by mouth 2 times daily (with meals) 180 tablet 3     escitalopram (LEXAPRO) 10 MG tablet TAKE 1 TABLET BY MOUTH EVERY DAY THIS IS 10 MG TAB, JUST TAKE 1 TAB       losartan (COZAAR) 50 MG tablet Take 1 tablet (50 mg) by mouth daily 90 tablet 3               Review of Systems:       Review of Systems:  Skin:  Negative     Eyes:  Negative    ENT:  Negative    Respiratory:  Negative    Cardiovascular:    Positive for;syncope or near-syncope;palpitations  Gastroenterology: Positive for nausea;vomiting  Genitourinary:  Positive  for urinary frequency  Musculoskeletal:  Negative    Neurologic:       Psychiatric:  Positive for anxiety  Heme/Lymph/Imm:  Negative    Endocrine:  Negative                       Data:   All available laboratory data reviewed  No results found for: CHOL  No results found for: HDL  No results found for: LDL  No results found for: TRIG  No results found for: CHOLHDLRATIO  TSH   Date Value Ref Range Status   09/03/2022 3.35 0.30 - 4.20 uIU/mL Final     Last Basic Metabolic Panel:  Lab Results   Component Value Date     09/03/2022     10/12/2014      Lab Results   Component Value Date    POTASSIUM 3.3 09/03/2022    POTASSIUM 3.9 04/25/2022    POTASSIUM 3.6 10/12/2014     Lab Results   Component Value Date    CHLORIDE 103 09/03/2022    CHLORIDE 107 04/25/2022    CHLORIDE 108 10/12/2014     Lab Results   Component Value Date    MARSHA 8.6 09/03/2022    MARSHA 8.7 10/12/2014     Lab Results   Component Value Date    CO2 22 09/03/2022    CO2 25 04/25/2022    CO2 24 10/12/2014     Lab Results   Component Value Date    BUN 18.5 09/03/2022    BUN 20 04/25/2022    BUN 19 10/12/2014     Lab Results   Component Value Date    CR 1.17 09/03/2022    CR 1.17 10/12/2014     Lab Results   Component Value Date     09/03/2022     04/25/2022     10/12/2014     Lab Results   Component Value Date    WBC 10.5 09/03/2022    WBC 8.8 10/12/2014     Lab Results   Component Value Date    RBC 5.57 09/03/2022    RBC 5.44 10/12/2014     Lab Results   Component Value Date    HGB 15.7 09/03/2022    HGB 15.9 10/12/2014     Lab Results   Component Value Date    HCT 49.4 09/03/2022    HCT 46.4 10/12/2014     Lab Results   Component Value Date    MCV 89 09/03/2022    MCV 85 10/12/2014     Lab Results   Component Value Date    MCH 28.2 09/03/2022    MCH 29.2 10/12/2014     Lab Results   Component Value Date    MCHC 31.8 09/03/2022    MCHC 34.3 10/12/2014     Lab Results   Component Value Date    RDW 12.8 09/03/2022    RDW 13.0  10/12/2014     Lab Results   Component Value Date     09/03/2022     10/12/2014       Thank you for allowing me to participate in the care of your patient.      Sincerely,     David Cleaning MD     [facility]  cc:   Referred Self,

## 2022-10-18 ENCOUNTER — HOSPITAL ENCOUNTER (OUTPATIENT)
Dept: CARDIOLOGY | Facility: CLINIC | Age: 72
Discharge: HOME | End: 2022-10-18
Attending: INTERNAL MEDICINE
Payer: COMMERCIAL

## 2022-10-18 DIAGNOSIS — R55 SYNCOPE, UNSPECIFIED SYNCOPE TYPE: ICD-10-CM

## 2022-10-18 DIAGNOSIS — I10 ESSENTIAL HYPERTENSION, BENIGN: ICD-10-CM

## 2022-10-18 LAB — LVEF ECHO: NORMAL

## 2022-10-18 PROCEDURE — 93306 TTE W/DOPPLER COMPLETE: CPT | Mod: 26 | Performed by: INTERNAL MEDICINE

## 2022-10-18 PROCEDURE — 93306 TTE W/DOPPLER COMPLETE: CPT

## 2022-10-18 PROCEDURE — 93880 EXTRACRANIAL BILAT STUDY: CPT

## 2022-10-18 PROCEDURE — 93880 EXTRACRANIAL BILAT STUDY: CPT | Mod: 26 | Performed by: INTERNAL MEDICINE

## 2022-10-22 ENCOUNTER — HEALTH MAINTENANCE LETTER (OUTPATIENT)
Age: 72
End: 2022-10-22

## 2023-03-31 ENCOUNTER — OFFICE VISIT (OUTPATIENT)
Dept: CARDIOLOGY | Facility: CLINIC | Age: 73
End: 2023-03-31
Attending: INTERNAL MEDICINE
Payer: COMMERCIAL

## 2023-03-31 VITALS
HEIGHT: 72 IN | OXYGEN SATURATION: 97 % | WEIGHT: 220.4 LBS | SYSTOLIC BLOOD PRESSURE: 134 MMHG | BODY MASS INDEX: 29.85 KG/M2 | DIASTOLIC BLOOD PRESSURE: 84 MMHG | HEART RATE: 70 BPM

## 2023-03-31 DIAGNOSIS — I49.3 PVC'S (PREMATURE VENTRICULAR CONTRACTIONS): ICD-10-CM

## 2023-03-31 DIAGNOSIS — I47.10 PAROXYSMAL SUPRAVENTRICULAR TACHYCARDIA (H): Primary | ICD-10-CM

## 2023-03-31 DIAGNOSIS — I10 ESSENTIAL HYPERTENSION, BENIGN: ICD-10-CM

## 2023-03-31 DIAGNOSIS — R55 SYNCOPE, UNSPECIFIED SYNCOPE TYPE: ICD-10-CM

## 2023-03-31 PROCEDURE — 99214 OFFICE O/P EST MOD 30 MIN: CPT | Performed by: INTERNAL MEDICINE

## 2023-03-31 RX ORDER — CARVEDILOL 3.12 MG/1
3.12 TABLET ORAL 2 TIMES DAILY WITH MEALS
Qty: 180 TABLET | Refills: 3 | Status: SHIPPED | OUTPATIENT
Start: 2023-03-31

## 2023-03-31 RX ORDER — AMLODIPINE BESYLATE 10 MG/1
10 TABLET ORAL DAILY
Qty: 90 TABLET | Refills: 3 | Status: SHIPPED | OUTPATIENT
Start: 2023-03-31 | End: 2024-04-05

## 2023-03-31 RX ORDER — ATORVASTATIN CALCIUM 20 MG/1
20 TABLET, FILM COATED ORAL DAILY
Qty: 90 TABLET | Refills: 3 | Status: SHIPPED | OUTPATIENT
Start: 2023-03-31 | End: 2024-04-05

## 2023-03-31 RX ORDER — LOSARTAN POTASSIUM 50 MG/1
50 TABLET ORAL DAILY
Qty: 90 TABLET | Refills: 3 | Status: SHIPPED | OUTPATIENT
Start: 2023-03-31 | End: 2024-04-05

## 2023-03-31 NOTE — PROGRESS NOTES
Cardiology Progress Note          Assessment and Plan:       1. Presyncope associated with meals, improved with reduction in alcohol reduction in meal volume    Discussed trial of small dose of carvedilol as well.  Continue conservative management.  Follow-up in 1 year per patient request.    30 minutes was spent with the patient, precharting and reviewing tests as well as post visit charting all done today..    This note was transcribed using electronic voice recognition software and there may be typographical errors present.                    Interval History:     The patient is a very pleasant 72 year old whom I seen previously for presyncope/syncope.  He feels pretty well at this time.  He was mistaken and was only on 50 mg of losartan previously.  He never started the carvedilol.  He has been avoiding alcohol and eating out and he has not had any recurrent symptoms.  He does have some anxiety and is concerned.  His testing was unremarkable except for ZIO which had some PSVT and PVCs.                     Review of Systems:     Review of Systems:  Skin:  Negative     Eyes:  Negative    ENT:  Negative    Respiratory:  Positive for cough  Cardiovascular:  Negative    Gastroenterology: Negative    Genitourinary:  Positive for urinary frequency  Musculoskeletal:  Negative    Neurologic:  Negative    Psychiatric:  Negative    Heme/Lymph/Imm:  Negative    Endocrine:  Negative                Physical Exam:     Vitals: /84 (BP Location: Right arm, Patient Position: Sitting, Cuff Size: Adult Regular)   Pulse 70   Ht 1.829 m (6')   Wt 100 kg (220 lb 6.4 oz)   SpO2 97%   BMI 29.89 kg/m    Constitutional:  cooperative, alert and oriented, well developed, well nourished, in no acute distress        Skin:  warm and dry to the touch, no apparent skin lesions or masses noted        Head:  normocephalic, no masses or lesions        Eyes:  pupils equal and round, conjunctivae and lids unremarkable, sclera white, no  xanthalasma, EOMS intact, no nystagmus        Chest:  normal symmetry        Cardiac: regular rhythm;no murmurs, gallops or rubs detected                  Extremities and Back:  no deformities, clubbing, cyanosis, erythema observed;no edema        Neurological:  no gross motor deficits;affect appropriate                 Medications:     Current Outpatient Medications   Medication Sig Dispense Refill     amLODIPine (NORVASC) 10 MG tablet Take 1 tablet (10 mg) by mouth daily 90 tablet 3     atorvastatin (LIPITOR) 20 MG tablet Take 1 tablet (20 mg) by mouth daily 90 tablet 3     carvedilol (COREG) 3.125 MG tablet Take 1 tablet (3.125 mg) by mouth 2 times daily (with meals) 180 tablet 3     losartan (COZAAR) 50 MG tablet Take 1 tablet (50 mg) by mouth daily 90 tablet 3                Data:   All laboratory data reviewed  No results found for this or any previous visit (from the past 24 hour(s)).    All laboratory data reviewed  No results found for: CHOL  No results found for: HDL  No results found for: LDL  No results found for: TRIG  No results found for: CHOLHDLRATIO  TSH   Date Value Ref Range Status   09/03/2022 3.35 0.30 - 4.20 uIU/mL Final     Last Basic Metabolic Panel:  Lab Results   Component Value Date     09/03/2022     10/12/2014      Lab Results   Component Value Date    POTASSIUM 3.3 09/03/2022    POTASSIUM 3.9 04/25/2022    POTASSIUM 3.6 10/12/2014     Lab Results   Component Value Date    CHLORIDE 103 09/03/2022    CHLORIDE 107 04/25/2022    CHLORIDE 108 10/12/2014     Lab Results   Component Value Date    MARSHA 8.6 09/03/2022    MARSHA 8.7 10/12/2014     Lab Results   Component Value Date    CO2 22 09/03/2022    CO2 25 04/25/2022    CO2 24 10/12/2014     Lab Results   Component Value Date    BUN 18.5 09/03/2022    BUN 20 04/25/2022    BUN 19 10/12/2014     Lab Results   Component Value Date    CR 1.17 09/03/2022    CR 1.17 10/12/2014     Lab Results   Component Value Date     09/03/2022      04/25/2022     10/12/2014     Lab Results   Component Value Date    WBC 10.5 09/03/2022    WBC 8.8 10/12/2014     Lab Results   Component Value Date    RBC 5.57 09/03/2022    RBC 5.44 10/12/2014     Lab Results   Component Value Date    HGB 15.7 09/03/2022    HGB 15.9 10/12/2014     Lab Results   Component Value Date    HCT 49.4 09/03/2022    HCT 46.4 10/12/2014     Lab Results   Component Value Date    MCV 89 09/03/2022    MCV 85 10/12/2014     Lab Results   Component Value Date    MCH 28.2 09/03/2022    MCH 29.2 10/12/2014     Lab Results   Component Value Date    MCHC 31.8 09/03/2022    MCHC 34.3 10/12/2014     Lab Results   Component Value Date    RDW 12.8 09/03/2022    RDW 13.0 10/12/2014     Lab Results   Component Value Date     09/03/2022     10/12/2014

## 2023-03-31 NOTE — LETTER
3/31/2023    David T Tapper [facility] [place] [address]  [facility] [place] [address]    RE: Alex Youngblood       Dear Colleague,     I had the pleasure of seeing Alex Youngblood in the [facility].  Cardiology Progress Note          Assessment and Plan:       Presyncope associated with meals, improved with reduction in alcohol reduction in meal volume  Discussed trial of small dose of carvedilol as well.  Continue conservative management.  Follow-up in 1 year per patient request.    30 minutes was spent with the patient, precharting and reviewing tests as well as post visit charting all done today..    This note was transcribed using electronic voice recognition software and there may be typographical errors present.                    Interval History:     The patient is a very pleasant 72 year old whom I seen previously for presyncope/syncope.  He feels pretty well at this time.  He was mistaken and was only on 50 mg of losartan previously.  He never started the carvedilol.  He has been avoiding alcohol and eating out and he has not had any recurrent symptoms.  He does have some anxiety and is concerned.  His testing was unremarkable except for ZIO which had some PSVT and PVCs.                     Review of Systems:     Review of Systems:  Skin:  Negative     Eyes:  Negative    ENT:  Negative    Respiratory:  Positive for cough  Cardiovascular:  Negative    Gastroenterology: Negative    Genitourinary:  Positive for urinary frequency  Musculoskeletal:  Negative    Neurologic:  Negative    Psychiatric:  Negative    Heme/Lymph/Imm:  Negative    Endocrine:  Negative                Physical Exam:     Vitals: /84 (BP Location: Right arm, Patient Position: Sitting, Cuff Size: Adult Regular)   Pulse 70   Ht 1.829 m (6')   Wt 100 kg (220 lb 6.4 oz)   SpO2 97%   BMI 29.89 kg/m    Constitutional:  cooperative, alert and oriented, well developed, well nourished, in no acute distress         Skin:  warm and dry to the touch, no apparent skin lesions or masses noted        Head:  normocephalic, no masses or lesions        Eyes:  pupils equal and round, conjunctivae and lids unremarkable, sclera white, no xanthalasma, EOMS intact, no nystagmus        Chest:  normal symmetry        Cardiac: regular rhythm;no murmurs, gallops or rubs detected                  Extremities and Back:  no deformities, clubbing, cyanosis, erythema observed;no edema        Neurological:  no gross motor deficits;affect appropriate                 Medications:     Current Outpatient Medications   Medication Sig Dispense Refill    amLODIPine (NORVASC) 10 MG tablet Take 1 tablet (10 mg) by mouth daily 90 tablet 3    atorvastatin (LIPITOR) 20 MG tablet Take 1 tablet (20 mg) by mouth daily 90 tablet 3    carvedilol (COREG) 3.125 MG tablet Take 1 tablet (3.125 mg) by mouth 2 times daily (with meals) 180 tablet 3    losartan (COZAAR) 50 MG tablet Take 1 tablet (50 mg) by mouth daily 90 tablet 3                Data:   All laboratory data reviewed  No results found for this or any previous visit (from the past 24 hour(s)).    All laboratory data reviewed  No results found for: CHOL  No results found for: HDL  No results found for: LDL  No results found for: TRIG  No results found for: CHOLHDLRATIO  TSH   Date Value Ref Range Status   09/03/2022 3.35 0.30 - 4.20 uIU/mL Final     Last Basic Metabolic Panel:  Lab Results   Component Value Date     09/03/2022     10/12/2014      Lab Results   Component Value Date    POTASSIUM 3.3 09/03/2022    POTASSIUM 3.9 04/25/2022    POTASSIUM 3.6 10/12/2014     Lab Results   Component Value Date    CHLORIDE 103 09/03/2022    CHLORIDE 107 04/25/2022    CHLORIDE 108 10/12/2014     Lab Results   Component Value Date    MARSHA 8.6 09/03/2022    MARSHA 8.7 10/12/2014     Lab Results   Component Value Date    CO2 22 09/03/2022    CO2 25 04/25/2022    CO2 24 10/12/2014     Lab Results   Component Value  Date    BUN 18.5 09/03/2022    BUN 20 04/25/2022    BUN 19 10/12/2014     Lab Results   Component Value Date    CR 1.17 09/03/2022    CR 1.17 10/12/2014     Lab Results   Component Value Date     09/03/2022     04/25/2022     10/12/2014     Lab Results   Component Value Date    WBC 10.5 09/03/2022    WBC 8.8 10/12/2014     Lab Results   Component Value Date    RBC 5.57 09/03/2022    RBC 5.44 10/12/2014     Lab Results   Component Value Date    HGB 15.7 09/03/2022    HGB 15.9 10/12/2014     Lab Results   Component Value Date    HCT 49.4 09/03/2022    HCT 46.4 10/12/2014     Lab Results   Component Value Date    MCV 89 09/03/2022    MCV 85 10/12/2014     Lab Results   Component Value Date    MCH 28.2 09/03/2022    MCH 29.2 10/12/2014     Lab Results   Component Value Date    MCHC 31.8 09/03/2022    MCHC 34.3 10/12/2014     Lab Results   Component Value Date    RDW 12.8 09/03/2022    RDW 13.0 10/12/2014     Lab Results   Component Value Date     09/03/2022     10/12/2014                 Thank you for allowing me to participate in the care of your patient.      Sincerely,     David Cleaning MD     [facility]  cc:   David Cleaning MD  [address]  [place] [address]

## 2023-11-05 ENCOUNTER — HEALTH MAINTENANCE LETTER (OUTPATIENT)
Age: 73
End: 2023-11-05

## 2024-04-05 DIAGNOSIS — R55 SYNCOPE, UNSPECIFIED SYNCOPE TYPE: ICD-10-CM

## 2024-04-05 DIAGNOSIS — I10 ESSENTIAL HYPERTENSION, BENIGN: Primary | ICD-10-CM

## 2024-04-05 DIAGNOSIS — I49.3 PVC'S (PREMATURE VENTRICULAR CONTRACTIONS): ICD-10-CM

## 2024-04-05 DIAGNOSIS — I10 ESSENTIAL HYPERTENSION, BENIGN: ICD-10-CM

## 2024-04-05 RX ORDER — AMLODIPINE BESYLATE 10 MG/1
10 TABLET ORAL DAILY
Qty: 90 TABLET | Refills: 0 | Status: SHIPPED | OUTPATIENT
Start: 2024-04-05 | End: 2024-07-05

## 2024-04-05 RX ORDER — ATORVASTATIN CALCIUM 20 MG/1
20 TABLET, FILM COATED ORAL DAILY
Qty: 90 TABLET | Refills: 0 | Status: SHIPPED | OUTPATIENT
Start: 2024-04-05 | End: 2024-06-25

## 2024-04-05 RX ORDER — LOSARTAN POTASSIUM 50 MG/1
50 TABLET ORAL DAILY
Qty: 90 TABLET | Refills: 0 | Status: SHIPPED | OUTPATIENT
Start: 2024-04-05 | End: 2024-06-24

## 2024-04-05 NOTE — TELEPHONE ENCOUNTER
[facility] Cardiology Refill Guideline reviewed.  Medication does not meet criteria for refill due to Needs Lipids and Alt.  Messaged to providers team for further review.

## 2024-04-05 NOTE — TELEPHONE ENCOUNTER
Follow up and lab orders placed. Message to pharmacy that patient needs labs and apt scheduled for further refills. 90 day supply sent.

## 2024-06-24 DIAGNOSIS — R55 SYNCOPE, UNSPECIFIED SYNCOPE TYPE: ICD-10-CM

## 2024-06-24 RX ORDER — LOSARTAN POTASSIUM 50 MG/1
50 TABLET ORAL DAILY
Qty: 90 TABLET | Refills: 1 | Status: SHIPPED | OUTPATIENT
Start: 2024-06-24

## 2024-06-25 DIAGNOSIS — I10 ESSENTIAL HYPERTENSION, BENIGN: ICD-10-CM

## 2024-06-25 DIAGNOSIS — R55 SYNCOPE, UNSPECIFIED SYNCOPE TYPE: ICD-10-CM

## 2024-06-25 RX ORDER — ATORVASTATIN CALCIUM 20 MG/1
20 TABLET, FILM COATED ORAL DAILY
Qty: 90 TABLET | Refills: 1 | Status: SHIPPED | OUTPATIENT
Start: 2024-06-25

## 2024-06-25 NOTE — TELEPHONE ENCOUNTER
[facility] Cardiology Refill Guideline reviewed.  Medication meets criteria for refill. Patient has fasting labs scheduled for 11/20/24 and an appointment with Dr. Cleaning scheduled for 11/21/24. Refills sent to pharmacy to get patient to next appointments.

## 2024-07-05 DIAGNOSIS — R55 SYNCOPE, UNSPECIFIED SYNCOPE TYPE: ICD-10-CM

## 2024-07-05 DIAGNOSIS — I10 ESSENTIAL HYPERTENSION, BENIGN: ICD-10-CM

## 2024-07-05 RX ORDER — AMLODIPINE BESYLATE 10 MG/1
10 TABLET ORAL DAILY
Qty: 90 TABLET | Refills: 3 | Status: SHIPPED | OUTPATIENT
Start: 2024-07-05

## 2024-07-05 NOTE — TELEPHONE ENCOUNTER
Team received refill request from [facility] pharmacy for Amlodipine 10mg tab daily.    Patient has fasting labs scheduled for 11/20/24 and an appointment with Dr. Cleaning scheduled for 11/21/24.     [facility] Cardiology Refill Guideline reviewed.  Medication meets criteria for refill.    Soniya Dooley RN, BSN   Team 3  [phone]

## 2024-08-24 ENCOUNTER — OFFICE VISIT (OUTPATIENT)
Dept: URGENT CARE | Facility: URGENT CARE | Age: 74
End: 2024-08-24
Payer: COMMERCIAL

## 2024-08-24 ENCOUNTER — NURSE TRIAGE (OUTPATIENT)
Dept: NURSING | Facility: CLINIC | Age: 74
End: 2024-08-24
Payer: COMMERCIAL

## 2024-08-24 VITALS
DIASTOLIC BLOOD PRESSURE: 82 MMHG | BODY MASS INDEX: 29.84 KG/M2 | HEART RATE: 58 BPM | WEIGHT: 220 LBS | TEMPERATURE: 97.5 F | RESPIRATION RATE: 22 BRPM | OXYGEN SATURATION: 97 % | SYSTOLIC BLOOD PRESSURE: 138 MMHG

## 2024-08-24 DIAGNOSIS — L01.00 IMPETIGO: Primary | ICD-10-CM

## 2024-08-24 PROCEDURE — 99203 OFFICE O/P NEW LOW 30 MIN: CPT | Performed by: PHYSICIAN ASSISTANT

## 2024-08-24 RX ORDER — CEPHALEXIN 500 MG/1
500 CAPSULE ORAL 4 TIMES DAILY
Qty: 28 CAPSULE | Refills: 0 | Status: SHIPPED | OUTPATIENT
Start: 2024-08-24 | End: 2024-08-31

## 2024-08-24 NOTE — TELEPHONE ENCOUNTER
Patient trying to call his clinic, which is closed until Monday.  Patient will go to .    Rajni Ward RN  [facility]

## 2024-08-24 NOTE — PROGRESS NOTES
Assessment & Plan          1. Impetigo    - cephALEXin (KEFLEX) 500 MG capsule; Take 1 capsule (500 mg) by mouth 4 times daily for 7 days.  Dispense: 28 capsule; Refill: 0    Patient Instructions   Expect to see improvement of symptoms in 2-3 after starting antibiotics. Follow up in the clinic or ED if symptoms worsen. Complete resolution of symptoms expected after 7 days      Return if symptoms worsen or fail to improve, for Follow up.    At the end of the encounter, I discussed results, diagnosis, medications. Discussed red flags for immediate return to clinic/ER, as well as indications for follow up if no improvement. Patient understood and agreed to plan. Patient was stable for discharge.    Gavin Johnson is a 73 year old male who presents to clinic today  for the following health issues:  Chief Complaint   Patient presents with    Urgent Care     X 4 days ago, bumps on side of chins, itching, redness. Benadryl, triamcinolone cream- no improvement.      HPI    Patient reports rash on the chin which started 4 days ago.  He reports itching and redness.  She tried Benadryl, Kenalog cream 0.1% without improvement.  He denies fever or chills.    Review of Systems   Skin:  Positive for rash.       Problem List:  There are no relevant problems documented for this patient.      Past Medical History:   Diagnosis Date    Seizures (H)     Syncope        Social History     Tobacco Use    Smoking status: Former     Current packs/day: 0.00     Types: Cigarettes     Quit date: 2013     Years since quittin.2    Smokeless tobacco: Not on file   Substance Use Topics    Alcohol use: Yes     Comment: socially            Objective    /82   Pulse 58   Temp 97.5  F (36.4  C)   Resp 22   Wt 99.8 kg (220 lb)   SpO2 97%   BMI 29.84 kg/m    Physical Exam  Constitutional:       Appearance: Normal appearance.   HENT:      Head: Normocephalic.        Comments: Erythema, yellow crusts lesions on bilateral  chin  Cardiovascular:      Rate and Rhythm: Normal rate and regular rhythm.   Pulmonary:      Effort: Pulmonary effort is normal.      Breath sounds: Normal breath sounds.   Musculoskeletal:      Cervical back: Normal range of motion and neck supple.   Lymphadenopathy:      Head:      Right side of head: No submental, submandibular or tonsillar adenopathy.      Left side of head: No submental, submandibular or tonsillar adenopathy.   Skin:     General: Skin is warm and dry.      Findings: Rash present.   Neurological:      Mental Status: He is alert and oriented to person, place, and time.              Keyla Sun PA-C

## 2024-08-24 NOTE — TELEPHONE ENCOUNTER
Nurse Triage SBAR    Is this a 2nd Level Triage? NO    Situation: Rash    Background: Rash on chin x 4 days. Rash is red, swollen and itchy. Has tried topical benadryl which only last for about an hour.     Assessment: Noticed a small amount of pus coming out of this morning but nothing now. Has a small cluster of blisters. Area is really itchy but not painful. Has tried calamine and triamcinolone ointment which did not help. Reports two rash areas on his chin, both less than 2 inches in size. Denies fever, spreading/streaking redness, or pain.     Protocol Recommended Disposition:   Call PCP Within 24 Hours    Recommendation: Call PCP at [facility] within 24 hours. Advised patient to be seen in  this weekend if he is unable to reach anyone at this PCP clinic today.       Reason for Disposition   [1] Applying cream or ointment AND [2] causes severe itch, burning or pain    Additional Information   Negative: [1] Sudden onset of rash (within last 2 hours) AND [2] difficulty breathing or swallowing   Negative: Sounds like a life-threatening emergency to the triager   Negative: [1] Localized purple or blood-colored spots or dots AND [2] not from injury or friction AND [3] fever   Negative: Patient sounds very sick or weak to the triager   Negative: [1] Red area or streak AND [2] fever   Negative: [1] Rash is painful to touch AND [2] fever   Negative: [1] Looks infected (spreading redness, pus) AND [2] large red area (> 2 in. or 5 cm)   Negative: [1] Looks infected (spreading redness, pus) AND [2] diabetes mellitus or weak immune system (e.g., HIV positive, cancer chemo, splenectomy, organ transplant, chronic steroids)   Negative: [1] Localized purple or blood-colored spots or dots AND [2] not from injury or friction AND [3] no fever   Negative: [1] Looks infected (spreading redness, pus) AND [2] no fever   Negative: Looks like a boil, infected sore, deep ulcer or other infected rash   Negative: [1]  Localized rash is very painful AND [2] no fever   Negative: Genital area rash   Negative: Lyme disease suspected (e.g., bull's eye rash or tick bite / exposure)    Protocols used: Rash or Redness - [id number]

## 2024-08-24 NOTE — PATIENT INSTRUCTIONS
Expect to see improvement of symptoms in 2-3 after starting antibiotics. Follow up in the clinic or ED if symptoms worsen. Complete resolution of symptoms expected after 7 days

## 2024-08-26 ENCOUNTER — OFFICE VISIT (OUTPATIENT)
Dept: URGENT CARE | Facility: URGENT CARE | Age: 74
End: 2024-08-26
Payer: COMMERCIAL

## 2024-08-26 VITALS
TEMPERATURE: 97.8 F | SYSTOLIC BLOOD PRESSURE: 136 MMHG | DIASTOLIC BLOOD PRESSURE: 87 MMHG | HEART RATE: 63 BPM | OXYGEN SATURATION: 96 %

## 2024-08-26 DIAGNOSIS — L30.9 DERMATITIS: Primary | ICD-10-CM

## 2024-08-26 PROCEDURE — 87077 CULTURE AEROBIC IDENTIFY: CPT | Performed by: PHYSICIAN ASSISTANT

## 2024-08-26 PROCEDURE — 87186 SC STD MICRODIL/AGAR DIL: CPT | Mod: 59 | Performed by: PHYSICIAN ASSISTANT

## 2024-08-26 PROCEDURE — 99213 OFFICE O/P EST LOW 20 MIN: CPT | Performed by: PHYSICIAN ASSISTANT

## 2024-08-26 PROCEDURE — 87070 CULTURE OTHR SPECIMN AEROBIC: CPT | Performed by: PHYSICIAN ASSISTANT

## 2024-08-26 RX ORDER — PREDNISONE 20 MG/1
40 TABLET ORAL DAILY
Qty: 10 TABLET | Refills: 0 | Status: SHIPPED | OUTPATIENT
Start: 2024-08-26 | End: 2024-08-31

## 2024-08-26 RX ORDER — ESCITALOPRAM OXALATE 5 MG/1
TABLET ORAL
COMMUNITY

## 2024-08-26 NOTE — PATIENT INSTRUCTIONS
Patient was educated on the natural course of rash.  Take medication as prescribed. Side effects discussed. Continue Cephalexin. Wound culture is pending. Conservative measures discussed including over-the-counter antihistamines (Cetirizine). See your primary care provider if symptoms worsen or do not improve in 7 days. Seek emergency care if you develop severe pain/redness, shortness of breath, or difficulty swallowing.

## 2024-08-26 NOTE — PROGRESS NOTES
Hever Ortega  [address] [facility]  [place] [address]    Dr. Levi Farfan  [facility]  [address] [facility], [address]  ** Please enter through Outpatient Surgery Entrance.  Take elevator to the 2nd floor.  ** Please Call [phone] if you have questions regarding the test or if you need to reschedule or cancel.        Please follow these instructions. Disregard instructions that are provided on the medication package.    Date of Procedure: Tuesday April 13, 2021  Check in at: 10:15 AM   Procedure at: 11:15 AM    Your laxative prescription (NuLytely) may be picked up from:  [facility] [employer]    Colonoscopy Preparation Instructions  ?  **Please make arrangements for a responsible adult to drive you home. (A responsible adult is someone age 18 or older who can receive and understand instructions, stay with you, and call for assistance as instructed).**    Regarding Your Medications Prior to Your Procedure:  • Do not take any iron or iron-containing multivitamins beginning five days prior to your procedure.    • Blood-thinners typically need to be stopped a few days prior to the procedure. Check with your prescribing physician if you take blood thinners such as Coumadin (warfarin), Pradaxa, Plavix, Eliquis, Xarelto or Brilinta.  • Do not take aspirin or anti-inflammatory medications (Advil, Motrin, ibuprofen, Aleve, naproxen), in the morning on the day of your procedure.  • If you are diabetic:  o Take half of your usual dose of insulin the night before. DO NOT take insulin the morning of the procedure.  o Hold oral diabetic medications the night before and the morning of the procedure.     The Day Before the Procedure:  • Start a clear liquid diet at breakfast. Continue a clear liquid diet for the entire day in unlimited amounts. Clear liquids are listed below.   • In the morning, add tap water to the laxative container, shake well and place in the refrigerator.   • Lemonade flavor Crystal  URGENT CARE VISIT:    SUBJECTIVE:   HPI:   Alex Youngblood is a 73 year old who presents with rash located over chin since 5 day(s) ago. Rash is gradual onset and rash seems to be worsening. He describes rash as itching. Patient denies difficulty breathing or throat/tongue swelling. Patient has tried Cephalexin with no relief of symptoms. Patient has not had contact exposures to new laundry detergents, soaps, lotions, or other potential irritants. Denies new foods or medications.  Patient denies previous history of a similar rash. No one around them has had a similar rash.     PMH:   Past Medical History:   Diagnosis Date    Seizures (H)     Syncope      Allergies: Oxycodone   Medications:   Current Outpatient Medications   Medication Sig Dispense Refill    amLODIPine (NORVASC) 10 MG tablet Take 1 tablet (10 mg) by mouth daily 90 tablet 3    atorvastatin (LIPITOR) 20 MG tablet Take 1 tablet (20 mg) by mouth daily 90 tablet 1    cephALEXin (KEFLEX) 500 MG capsule Take 1 capsule (500 mg) by mouth 4 times daily for 7 days. 28 capsule 0    losartan (COZAAR) 50 MG tablet Take 1 tablet (50 mg) by mouth daily 90 tablet 1    predniSONE (DELTASONE) 20 MG tablet Take 2 tablets (40 mg) by mouth daily for 5 days. 10 tablet 0    carvedilol (COREG) 3.125 MG tablet Take 1 tablet (3.125 mg) by mouth 2 times daily (with meals) (Patient not taking: Reported on 2024) 180 tablet 3    escitalopram (LEXAPRO) 5 MG tablet  (Patient not taking: Reported on 2024)       Social History:   Social History     Socioeconomic History    Marital status:      Spouse name: Not on file    Number of children: Not on file    Years of education: Not on file    Highest education level: Not on file   Occupational History    Not on file   Tobacco Use    Smoking status: Former     Current packs/day: 0.00     Types: Cigarettes     Quit date: 2013     Years since quittin.2    Smokeless tobacco: Not on file   Substance and Sexual  Activity    Alcohol use: Yes     Comment: socially     Drug use: Not on file    Sexual activity: Not on file   Other Topics Concern    Parent/sibling w/ CABG, MI or angioplasty before 65F 55M? Not Asked   Social History Narrative    Not on file     Social Determinants of Health     Financial Resource Strain: Not At Risk (8/19/2023)    Received from [facility]    Financial Resource Strain     Is it hard for you to pay for the very basics like food, housing, medical care or heating?: No   Food Insecurity: Not At Risk (8/19/2023)    Received from [facility]    Food Insecurity     Does your food run out before you have the money to buy more?: No   Transportation Needs: Not At Risk (8/19/2023)    Received from [facility]    Transportation Needs     Does a lack of transportation keep you from your medical appointments or from getting your medications?: No   Physical Activity: Not on file   Stress: Not on file   Social Connections: Not on file   Interpersonal Safety: Not on file   Housing Stability: Not on file       ROS: ROS otherwise found to be negative except as noted above.    OBJECTIVE:  /87   Pulse 63   Temp 97.8  F (36.6  C) (Tympanic)   SpO2 96%   General: WDWN in NAD.   Eyes: Non-injected conjunctivas without drainage bilaterally.  Ears: Bilateral TMs are easily visualized without erythema, injection, or effusion. No erythema or edema of external canals.    Oropharynx: No erythema of oropharynx. No edema of tongue.   Cardiac: RRR without murmurs, rubs, or gallops.  Respiratory: LCTAB without adventitious sounds. Non-labored breathing.  Integumentary:   Distribution: localized  Location: beard    Color: red and crusty,  Lesion type: macular, confluent with serous fluid and honey crusting  Neuro: Alert and oriented.         ASSESSMENT:     ICD-10-CM    1. Dermatitis  L30.9 predniSONE (DELTASONE) 20 MG tablet     Skin Aerobic Bacterial Culture  Lite mix, obtained at the grocery store, can also be used to improve the flavor. Once water is added, the solution is only good for 48 hours.   • At approximately 5 PM, (or when you are home for the evening), begin drinking the Nulytely solution. Drink 8 oz. Every 20 minutes until you have consumed half of the gallon of the solution.   • Walking will help the laxative move through the colon.     The Day of the Procedure:  • At 6:00 AM begin drinking the remaining Nulytely solution. Drink 8 oz. Every 10 minutes until you have consumed the remainder of the gallon of the solution.   • You may take your morning medications with a sip of water, unless otherwise directed.    • Do not have anything by mouth after 9:15 AM.     Clear Liquid Diet:  Do not consume anything red or purple.    Beverages: soft drinks/soda, Gatorade or Zion-Aid, clear fruit juices without pulp, water, tea, coffee (no milk or non dairy creamer).   Broths: chicken, beef or vegetable.   Desserts: hard candies, Jell-O, Popsicles. (No fruit bars or sherbet)    If stools are not clear yellow the morning of the procedure, please call the GI lab at [phone] ext [phone].                                   About Your Colonoscopy  What is colonoscopy?    Colonoscopy is a procedure that allows your doctor to clearly see the lining of your colon (large bowel). A flexible tube, about the thickness of your finger, is put into the rectum and moved slowly through the entire colon. A special camera in the tube allows the doctor to see any problem areas in the colon.    Why is a colonoscopy needed?    A colonoscopy can be done:     1.   As a screening test for colon cancer. The American Cancer Society recommends colon screening for every adult starting at age 50, sometimes earlier, if you have a family history of colon cancer or polyps.   Ask your doctor when you should be screened.     2.  To find out what is causing symptoms such as rectal bleeding or changes in  Routine Without Gram Stain           PLAN:  Patient Instructions   Patient was educated on the natural course of rash.  Take medication as prescribed. Side effects discussed. Continue Cephalexin. Wound culture is pending. Conservative measures discussed including over-the-counter antihistamines (Cetirizine). See your primary care provider if symptoms worsen or do not improve in 7 days. Seek emergency care if you develop severe pain/redness, shortness of breath, or difficulty swallowing.    Patient verbalized understanding and is agreeable to plan. The patient was discharged ambulatory and in stable condition.    Snow Ro PA-C on 8/26/2024 at 4:32 PM   bowel habits (X-rays alone may not find the problem).    3.  As a regular follow-up exam for patients with previous polyps, colon cancer, or a family history of colon cancer.     How do I prepare for the colonoscopy?    For the best possible exam, the colon must be completely empty. Your doctor will give you detailed instructions for a cleansing routine and diet to follow. Or, you will be told what time to arrive at the hospital to begin the cleansing routine before your colonoscopy.    Can I take my medicines?    Most medicines can be taken as usual, but some can interfere with the preparation or the exam. Please talk with your doctor at least a week before the exam. Ask about taking your medicines, especially if you take aspirin products, anticoagulants (blood thinners), arthritis or blood pressure medicines, insulin or iron products.    What happens during the colonoscopy?    Your doctor will give you medicine through a vein in your arm to help you relax and stay comfortable during the exam. You will lie on your side or on your back while the flexible tube is moved slowly through the large bowel. As the tube is slowly withdrawn, the doctor looks at the lining of the large bowel. You may feel some cramping or gas but the medicine should keep you comfortable. The exam usually takes about 20 to 30 minutes.    What is a polypectomy?    Sometimes polyps are found during a colonoscopy.     Polyps are abnormal growths of tissue found on the colon lining. If your doctor thinks a polyp should be removed, a small wire loop or snare will be passed through the tube and the polyp will be cut out.  You will not feel this. Most polyps are benign (not cancer). But some may contain an area of cancer or may develop into cancer.     Removal of colon polyps is an important way to prevent colon cancer. People who have had a history of polyps may need to have follow-up colonoscopies to check if new polyps have formed. These follow-up  exams usually occur in one to five years of your first exam (your doctor will tell you when you need to schedule another exam). Some people who have large polyps or cancerous polyps may need to have surgery. Your doctor will educate and prepare you for this step, if needed.    What happens after the colonoscopy?    • Your doctor will explain the results to you.   • You may have some cramping or bloating because of the air put into the colon during the exam. This should go away quickly with passage of gas.   • Generally, you should be able to eat and drink as usual after the exam.  •   If you were given medicines to help you relax during the exam, someone must take you home.  For your own safety, please have a friend or family member drive you. If you do not have a ride home, your procedure may need to be rescheduled.    Going home    •  You should not drive or operate any machinery for 24 hours. Even if you feel alert, your judgment and reflexes may be slower from the sedative medicine.  •  Do not make legally binding decisions for the next 24 hours.  •  Do not drink alcohol for the next 24 hours.      Are there complications of colonoscopy?    Complications after a colonoscopy are rare, but can occur.         What are the risks of colonoscopy?    While this is a relatively safe and routine procedure, there are risks associated with it.  The average risk of potential complications is reported to be less than 1%.  Please note this percent is NOT zero.  Complications can and do occur.    These can include, but are not limited to:    · Perforation:  A tear or hole in the colon.  Average risk is generally less than 0.1%.  Emergency surgery may be required to repair this.  · Bleeding after the removal of a polyp can occur, generally less than 0.5% risk.  This will often stop on its own, but may require blood transfusion, repeat colonoscopy, or surgery.  · Risk of infection or injury to internal organs is extremely  low.  · Complications from the sedation, which can include (and are not limited to): breathing or blood pressure problems, pneumonia infection, heart problems or heart attack, stroke, etc.  · While there are risks as mentioned above, having a colonoscopy to identify and remove polyps can prevent up to 90% of colorectal cancers.    When should I call the doctor?    Although complications after colonoscopy are not common, it is important to know the early signs of any possible complication. Call the doctor who performed your colonoscopy if you notice:    • Severe abdominal pain  • Fever and chills  • Bloody bowel movement; bleeding can occur several days after polyp removal  • Distended and hard abdomen  • If you have any questions or concerns    Need more information?    Please talk with your doctor or the office staff if you have questions about the colonoscopy.   For any questions regarding cost, billing and insurance coverage, please call [phone]. If you have questions that have not been answered, please discuss them with the nurse or doctor before the exam begins.              About Your EGD    What is an EGD?    EGD stands for “esophagogastroduodenoscopy.”  It is a test in which your doctor can look closely at the upper part of your digestive tract:  · Esophagus (tube that sends food from mouth to stomach)  · Stomach  · Duodenum (first part of the small bowel)    An EGD is done using a long, thin flexible tube called an endoscope.  So, this test is also known as “upper endoscopy.”     Why is an EGD needed?    An EGD lets your doctor see the lining of your esophagus, stomach and duodenum.  This can be done for several reasons:    · It can confirm an abnormal area that was seen on an X-ray.   · It can find abnormal areas too small to be seen on an X-ray.   · The doctor can take a biopsy (small piece of tissue) of any areas through the endoscope.  If a biopsy is done, the tissue is sent to the lab to be  studied.  (Biopsies are done for many reasons and do not always mean cancer.)   · Other instruments can be passed through the endoscope without causing pain.  · A small brush can be used to wipe cells from an abnormal area. The cells are then sent to the lab to be studied.  · A tube or balloon can be used to open up a narrow area.  · Bleeding may be controlled by cautery (use of heat) or placement of clips.    Before your EGD    Please plan to have someone take you home.  You cannot drive after your test because the medicine given will make you drowsy.  You may not feel tired, but your judgment and reflexes may be slower than normal.      Do not eat anything 8 hours prior to your test (or whatever time your doctor tells you).  You may drink clear liquids only (water, black coffee, apple juice, Gatorade) until 2  hours before your procedure time.  Then nothing by mouth until after your test.  Your stomach must be completely empty.      Do not take aspirin, aspirin products or arthritis medicines on the day of your test.  If you take a blood thinner or diabetes medicine, ask your doctor whether you should take these.  Tell your doctor and nurse if you are allergic to any drugs before coming for the test.    During your EGD    · The back of your throat will be numbed by medication that may be sprayed or gargled.  · The doctor will give you medicine to relax you before the endoscope is passed.  · While you are in a comfortable position, the endoscope will be passed through your mouth.  Each part of the esophagus, stomach and duodenum will be studied.  This usually takes less than 10 minutes.  · You should feel little or no discomfort.  Most patients are asleep.  The tube will not interfere with your breathing.  The medicine should prevent you from gagging.    Going home    · You should not drive or operate machinery for 24 hours.  Even if you feel alert, your judgement and reflexes may be slower from the sedative  medicine.  · Do not make legally binding decisions for the next 24 hours.  · Do not drink alcohol for the next 24 hours.  · Your throat may be a little sore.  Using lozenges or gargling with warm salt water can help the discomfort.  · You may eat and drink as usual unless your doctor tells you to change your diet.  · Call your doctor if you have chest pain, trouble swallowing, fever over 101°F, stomach pain, trouble breathing, you vomit blood or coffeeground-like material, or your bowel movement is black or bloody.                                INSURANCE COVERAGE REGARDING PAYMENT  FOR YOUR COLONOSCOPY    **To obtain a pre-service estimate of your procedure - call [phone] to reach the **    Colon Cancer is the second leading cause of death among cancers, per the American Cancer Society.  It is preventable.  Early detection is the key.  Your doctor will determine which tests need to be done for prevention and/or treatment.    If during the course of a screening colonoscopy, our physician finds an abnormality, performs a biopsy or polypectomy (removal of polyp), your insurance company most likely will consider the procedure to be a diagnostic exam and no longer a screening procedure.    Every insurance company is different.  We encourage you to call your insurance company and ask them \"if during the course of a screening colonoscopy, an abnormality is discovered and the physician performs a biopsy or polypectomy, will the procedure fall under your screening benefits or under diagnostic benefits\".  Generally, screening benefits and diagnostic benefits are paid at different levels.  This varies with each insurance company, so we want you to be aware of this prior to your procedure.  You do not have to call your insurance company if you have Medicare.    The authorization staff at [facility] will precertify your colonoscopy.  However, precertification, which serves as a notification is  never a guarantee of payment.  If you have questions regarding precertification for your procedure please contact your insurance company.                             Dr. Levi Farfan    4/8/2021      Hever Ortega  [address] [facility] [address]                    Dear Mr. Ortega    Please review the enclosed information.    Thank you.

## 2024-08-30 ENCOUNTER — TELEPHONE (OUTPATIENT)
Dept: SCHEDULING | Facility: CLINIC | Age: 74
End: 2024-08-30
Payer: COMMERCIAL

## 2024-09-01 NOTE — TELEPHONE ENCOUNTER
Test Results        Who ordered the test:  Dr. Ro     Type of test: Lab    Date of test:  8/30/24    Where was the test performed:  [place]     What are your questions/concerns?:  Requesting lab results from 8/26    Could we send this information to you in [employer] or would you prefer to receive a phone call?:   Patient would prefer a phone call   Okay to leave a detailed message?: Yes at Cell number on file:    Telephone Information:   Mobile [phone]       
Called and spoke to patient, he has further questions regarding bacteria found.   Will have provider call back.     Neha Nick Certified Medical Assistant    
Called and spoke to patient. Patient informed me he has completed the course of abx Prednisone and Keflex. Pt states sx improving but has mild itching and redness. Patient will F/U with PCP on Thursday.    Nick Wolf CMA on 9/1/2024 at 3:34 PM    
If patient needs escitalopram, that would not be coming from urgent care.  Can you please call the patient and clarify what he needs?  Maybe he's thinking he needs a prescription for Zyrtec that was recommended back on 8/26, but that's available OTC...  Please route back to  provider pool if there's anything we need to address.  Thanks!
Please contact    Wound culture positive for pantoea bacteria.  Antibiotic - keflex is appropriate for treatment, please finish full course  
Pt calling has confusion with medications. States he should have escitalopram send to the pharmacy per messages patient states is in his chart.  Pt states he had a discussion last night.     Unable to find prescription information.     Call patient      Hermila Bain-Central Scheduler    
Stable

## 2024-09-02 ENCOUNTER — NURSE TRIAGE (OUTPATIENT)
Dept: NURSING | Facility: CLINIC | Age: 74
End: 2024-09-02
Payer: COMMERCIAL

## 2024-09-02 LAB
BACTERIA SKIN AEROBE CULT: ABNORMAL

## 2024-09-02 NOTE — TELEPHONE ENCOUNTER
"Nurse Triage SBAR    Is this a 2nd Level Triage? NO    Situation:  area of skin on chin and neck red and painful    Background: was recently treated for impetigo and on keflex and prednisone. He said that he finished those Rx and \"rash\" still not getting better.     Assessment: Red and painful to touch, no fever, not getting less red. Could be cellulitis?     Protocol Recommended Disposition:   See PCP Within 24 Hours    Recommendation: Gave him option to call PCP office in the morning but he would rather be seen today at . He plans to go there.           Does the patient meet one of the following criteria for ADS visit consideration? 16+ years old, no PCP (internal or external) but seen at [facility]     TIP  Providers, please consider if this condition is appropriate for management at one of our Acute and Diagnostic Services sites.     If patient is a good candidate, please use dotphrase <dot>triageresponse and select Refer to ADS to document.      Reason for Disposition   Sore is painful    Additional Information   Negative: [1] Red streak runs from impetigo AND [2] fever   Negative: [1] Red spreading area around 1 sore AND [2] fever   Negative: [1] Red streak or bright red area around 1 sore AND [2] no fever   Negative: Several sores (3 or more)   Negative: Sore larger than a quarter (> 1 inch or > 2.5 cm across)   Negative: Sores and crusts are around openings to nose, or anywhere else on face    Protocols used: Impetigo (Infected Sore)-A-    "

## 2024-10-20 ENCOUNTER — HEALTH MAINTENANCE LETTER (OUTPATIENT)
Age: 74
End: 2024-10-20

## 2024-11-21 ENCOUNTER — OFFICE VISIT (OUTPATIENT)
Dept: CARDIOLOGY | Facility: CLINIC | Age: 74
End: 2024-11-21
Attending: INTERNAL MEDICINE
Payer: COMMERCIAL

## 2024-11-21 VITALS
DIASTOLIC BLOOD PRESSURE: 87 MMHG | HEART RATE: 55 BPM | SYSTOLIC BLOOD PRESSURE: 139 MMHG | HEIGHT: 72 IN | OXYGEN SATURATION: 97 % | BODY MASS INDEX: 31.17 KG/M2 | WEIGHT: 230.1 LBS

## 2024-11-21 DIAGNOSIS — I10 ESSENTIAL HYPERTENSION, BENIGN: ICD-10-CM

## 2024-11-21 DIAGNOSIS — I49.3 PVC'S (PREMATURE VENTRICULAR CONTRACTIONS): ICD-10-CM

## 2024-11-21 NOTE — LETTER
11/21/2024    Alex Macias  [address] [facility] [place] [address]    RE: Alex Youngblood       Dear Colleague,     I had the pleasure of seeing Alex Youngblood in the [facility].  Cardiology Progress Note          Assessment and Plan:       Presyncope associated with meals, improved with reduction in meal volume  We also discussed compressing buttocks and thighs when sitting for prolonged periods of time to prevent vasodilatation.  Also may consider continuing with compression stockings during meals and evaluation of venous disease if symptoms recur.  Patient may follow-up as needed      20 minutes was spent with the patient, precharting and reviewing tests as well as post visit charting all done today..    This note was transcribed using electronic voice recognition software and there may be typographical errors present.                  Interval History:     The patient is a very pleasant 74 year old whom I have seen previously for presyncope associated with meals.  It has happened in the mornings.  It has not happened since.  He has not taken the carvedilol.  He is still a little bit nervous about the presyncope.  He had brief SVT on monitor 2022.  He has full range of heart rates with exercise.  He wears compression stockings occasionally.  He has some mild lower extremity edema.  He has a brother with vein disease.                     Review of Systems:     Review of Systems:  Skin:  not assessed     Eyes:  not assessed    ENT:  not assessed    Respiratory:  Negative    Cardiovascular:    edema;Positive for  Gastroenterology: not assessed    Genitourinary:  not assessed    Musculoskeletal:  not assessed    Neurologic:  not assessed    Psychiatric:  not assessed    Heme/Lymph/Imm:  not assessed    Endocrine:  not assessed                Physical Exam:     Vitals: /87 (BP Location: Right arm, Patient Position: Sitting, Cuff Size: Adult Regular)   Pulse 55   Ht 1.829 m (6')   Wt 104.4 kg  "(230 lb 1.6 oz)   SpO2 97%   BMI 31.21 kg/m    Constitutional:  cooperative, alert and oriented, well developed, well nourished, in no acute distress        Skin:  warm and dry to the touch, no apparent skin lesions or masses noted        Head:  normocephalic, no masses or lesions        Eyes:  pupils equal and round, conjunctivae and lids unremarkable, sclera white, no xanthalasma, EOMS intact, no nystagmus        Chest:  normal symmetry        Cardiac: regular rhythm;no murmurs, gallops or rubs detected                  Extremities and Back:  no deformities, clubbing, cyanosis, erythema observed;no edema        Neurological:  no gross motor deficits;affect appropriate                 Medications:     Current Outpatient Medications   Medication Sig Dispense Refill     amLODIPine (NORVASC) 10 MG tablet Take 1 tablet (10 mg) by mouth daily 90 tablet 3     atorvastatin (LIPITOR) 20 MG tablet Take 1 tablet (20 mg) by mouth daily 90 tablet 1     losartan (COZAAR) 50 MG tablet Take 1 tablet (50 mg) by mouth daily 90 tablet 1     escitalopram (LEXAPRO) 5 MG tablet  (Patient not taking: Reported on 8/26/2024)                  Data:   All laboratory data reviewed  No results found for this or any previous visit (from the past 24 hours).    All laboratory data reviewed  No results found for: \"CHOL\"  No results found for: \"HDL\"  No results found for: \"LDL\"  No results found for: \"TRIG\"  No results found for: \"CHOLHDLRATIO\"  TSH   Date Value Ref Range Status   09/03/2022 3.35 0.30 - 4.20 uIU/mL Final     Last Basic Metabolic Panel:  Lab Results   Component Value Date     09/03/2022     10/12/2014      Lab Results   Component Value Date    POTASSIUM 3.3 09/03/2022    POTASSIUM 3.9 04/25/2022    POTASSIUM 3.6 10/12/2014     Lab Results   Component Value Date    CHLORIDE 103 09/03/2022    CHLORIDE 107 04/25/2022    CHLORIDE 108 10/12/2014     Lab Results   Component Value Date    MARSHA 8.6 09/03/2022    MARSHA 8.7 " 10/12/2014     Lab Results   Component Value Date    CO2 22 09/03/2022    CO2 25 04/25/2022    CO2 24 10/12/2014     Lab Results   Component Value Date    BUN 18.5 09/03/2022    BUN 20 04/25/2022    BUN 19 10/12/2014     Lab Results   Component Value Date    CR 1.17 09/03/2022    CR 1.17 10/12/2014     Lab Results   Component Value Date     09/03/2022     04/25/2022     10/12/2014     Lab Results   Component Value Date    WBC 10.5 09/03/2022    WBC 8.8 10/12/2014     Lab Results   Component Value Date    RBC 5.57 09/03/2022    RBC 5.44 10/12/2014     Lab Results   Component Value Date    HGB 15.7 09/03/2022    HGB 15.9 10/12/2014     Lab Results   Component Value Date    HCT 49.4 09/03/2022    HCT 46.4 10/12/2014     Lab Results   Component Value Date    MCV 89 09/03/2022    MCV 85 10/12/2014     Lab Results   Component Value Date    MCH 28.2 09/03/2022    MCH 29.2 10/12/2014     Lab Results   Component Value Date    MCHC 31.8 09/03/2022    MCHC 34.3 10/12/2014     Lab Results   Component Value Date    RDW 12.8 09/03/2022    RDW 13.0 10/12/2014     Lab Results   Component Value Date     09/03/2022     10/12/2014               Thank you for allowing me to participate in the care of your patient.      Sincerely,     David Cleaning MD     [facility]  cc:   David Cleaning MD  [address]  [place] [address]

## 2024-11-21 NOTE — PROGRESS NOTES
Cardiology Progress Note          Assessment and Plan:       Presyncope associated with meals, improved with reduction in meal volume  We also discussed compressing buttocks and thighs when sitting for prolonged periods of time to prevent vasodilatation.  Also may consider continuing with compression stockings during meals and evaluation of venous disease if symptoms recur.  Patient may follow-up as needed      20 minutes was spent with the patient, precharting and reviewing tests as well as post visit charting all done today..    This note was transcribed using electronic voice recognition software and there may be typographical errors present.                  Interval History:     The patient is a very pleasant 74 year old whom I have seen previously for presyncope associated with meals.  It has happened in the mornings.  It has not happened since.  He has not taken the carvedilol.  He is still a little bit nervous about the presyncope.  He had brief SVT on monitor 2022.  He has full range of heart rates with exercise.  He wears compression stockings occasionally.  He has some mild lower extremity edema.  He has a brother with vein disease.                     Review of Systems:     Review of Systems:  Skin:  not assessed     Eyes:  not assessed    ENT:  not assessed    Respiratory:  Negative    Cardiovascular:    edema;Positive for  Gastroenterology: not assessed    Genitourinary:  not assessed    Musculoskeletal:  not assessed    Neurologic:  not assessed    Psychiatric:  not assessed    Heme/Lymph/Imm:  not assessed    Endocrine:  not assessed                Physical Exam:     Vitals: /87 (BP Location: Right arm, Patient Position: Sitting, Cuff Size: Adult Regular)   Pulse 55   Ht 1.829 m (6')   Wt 104.4 kg (230 lb 1.6 oz)   SpO2 97%   BMI 31.21 kg/m    Constitutional:  cooperative, alert and oriented, well developed, well nourished, in no acute distress        Skin:  warm and dry to the touch, no  "apparent skin lesions or masses noted        Head:  normocephalic, no masses or lesions        Eyes:  pupils equal and round, conjunctivae and lids unremarkable, sclera white, no xanthalasma, EOMS intact, no nystagmus        Chest:  normal symmetry        Cardiac: regular rhythm;no murmurs, gallops or rubs detected                  Extremities and Back:  no deformities, clubbing, cyanosis, erythema observed;no edema        Neurological:  no gross motor deficits;affect appropriate                 Medications:     Current Outpatient Medications   Medication Sig Dispense Refill    amLODIPine (NORVASC) 10 MG tablet Take 1 tablet (10 mg) by mouth daily 90 tablet 3    atorvastatin (LIPITOR) 20 MG tablet Take 1 tablet (20 mg) by mouth daily 90 tablet 1    losartan (COZAAR) 50 MG tablet Take 1 tablet (50 mg) by mouth daily 90 tablet 1    escitalopram (LEXAPRO) 5 MG tablet  (Patient not taking: Reported on 8/26/2024)                  Data:   All laboratory data reviewed  No results found for this or any previous visit (from the past 24 hours).    All laboratory data reviewed  No results found for: \"CHOL\"  No results found for: \"HDL\"  No results found for: \"LDL\"  No results found for: \"TRIG\"  No results found for: \"CHOLHDLRATIO\"  TSH   Date Value Ref Range Status   09/03/2022 3.35 0.30 - 4.20 uIU/mL Final     Last Basic Metabolic Panel:  Lab Results   Component Value Date     09/03/2022     10/12/2014      Lab Results   Component Value Date    POTASSIUM 3.3 09/03/2022    POTASSIUM 3.9 04/25/2022    POTASSIUM 3.6 10/12/2014     Lab Results   Component Value Date    CHLORIDE 103 09/03/2022    CHLORIDE 107 04/25/2022    CHLORIDE 108 10/12/2014     Lab Results   Component Value Date    MARSHA 8.6 09/03/2022    MARSHA 8.7 10/12/2014     Lab Results   Component Value Date    CO2 22 09/03/2022    CO2 25 04/25/2022    CO2 24 10/12/2014     Lab Results   Component Value Date    BUN 18.5 09/03/2022    BUN 20 04/25/2022    BUN 19 " 10/12/2014     Lab Results   Component Value Date    CR 1.17 09/03/2022    CR 1.17 10/12/2014     Lab Results   Component Value Date     09/03/2022     04/25/2022     10/12/2014     Lab Results   Component Value Date    WBC 10.5 09/03/2022    WBC 8.8 10/12/2014     Lab Results   Component Value Date    RBC 5.57 09/03/2022    RBC 5.44 10/12/2014     Lab Results   Component Value Date    HGB 15.7 09/03/2022    HGB 15.9 10/12/2014     Lab Results   Component Value Date    HCT 49.4 09/03/2022    HCT 46.4 10/12/2014     Lab Results   Component Value Date    MCV 89 09/03/2022    MCV 85 10/12/2014     Lab Results   Component Value Date    MCH 28.2 09/03/2022    MCH 29.2 10/12/2014     Lab Results   Component Value Date    MCHC 31.8 09/03/2022    MCHC 34.3 10/12/2014     Lab Results   Component Value Date    RDW 12.8 09/03/2022    RDW 13.0 10/12/2014     Lab Results   Component Value Date     09/03/2022     10/12/2014